# Patient Record
Sex: FEMALE | Race: WHITE | NOT HISPANIC OR LATINO | URBAN - METROPOLITAN AREA
[De-identification: names, ages, dates, MRNs, and addresses within clinical notes are randomized per-mention and may not be internally consistent; named-entity substitution may affect disease eponyms.]

---

## 2017-04-08 ENCOUNTER — EMERGENCY (EMERGENCY)
Facility: HOSPITAL | Age: 67
LOS: 1 days | Discharge: HOME | End: 2017-04-08
Attending: EMERGENCY MEDICINE | Admitting: EMERGENCY MEDICINE
Payer: MEDICARE

## 2017-04-08 VITALS
TEMPERATURE: 99 F | RESPIRATION RATE: 16 BRPM | HEART RATE: 95 BPM | OXYGEN SATURATION: 96 % | DIASTOLIC BLOOD PRESSURE: 77 MMHG | SYSTOLIC BLOOD PRESSURE: 122 MMHG

## 2017-04-08 VITALS
SYSTOLIC BLOOD PRESSURE: 116 MMHG | HEIGHT: 67 IN | RESPIRATION RATE: 18 BRPM | TEMPERATURE: 99 F | OXYGEN SATURATION: 96 % | DIASTOLIC BLOOD PRESSURE: 77 MMHG | HEART RATE: 88 BPM | WEIGHT: 156.09 LBS

## 2017-04-08 DIAGNOSIS — S32.810A MULTIPLE FRACTURES OF PELVIS WITH STABLE DISRUPTION OF PELVIC RING, INITIAL ENCOUNTER FOR CLOSED FRACTURE: ICD-10-CM

## 2017-04-08 DIAGNOSIS — Z79.899 OTHER LONG TERM (CURRENT) DRUG THERAPY: ICD-10-CM
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
## 2017-04-08 DIAGNOSIS — Z96.651 PRESENCE OF RIGHT ARTIFICIAL KNEE JOINT: Chronic | ICD-10-CM
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
## 2017-04-08 DIAGNOSIS — Z79.82 LONG TERM (CURRENT) USE OF ASPIRIN: ICD-10-CM

## 2017-04-08 DIAGNOSIS — R10.2 PELVIC AND PERINEAL PAIN: ICD-10-CM
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
## 2017-04-08 PROCEDURE — 99284 EMERGENCY DEPT VISIT MOD MDM: CPT | Mod: 25

## 2017-04-08 PROCEDURE — 72190 X-RAY EXAM OF PELVIS: CPT | Mod: 26

## 2017-04-08 PROCEDURE — 72190 X-RAY EXAM OF PELVIS: CPT

## 2017-04-08 PROCEDURE — 99284 EMERGENCY DEPT VISIT MOD MDM: CPT

## 2017-04-08 RX ORDER — ACETAMINOPHEN WITH CODEINE 300MG-30MG
1 TABLET ORAL
Qty: 20 | Refills: 0
Start: 2017-04-08 | End: 2017-04-13

## 2017-04-08 RX ORDER — ACETAMINOPHEN WITH CODEINE 300MG-30MG
1 TABLET ORAL ONCE
Qty: 0 | Refills: 0 | Status: DISCONTINUED | OUTPATIENT
Start: 2017-04-08 | End: 2017-04-08

## 2017-04-08 RX ADMIN — Medication 1 TABLET(S): at 22:12

## 2017-04-08 NOTE — ED PROVIDER NOTE - OBJECTIVE STATEMENT
pt to ed co trip and fall onto right side no fever no dizzy no headache no chills no NVD no chest pain no SOB no shakes no aches no other  injury no other complaints no neck pain no back pain no open wounds

## 2017-04-08 NOTE — ED ADULT TRIAGE NOTE - CHIEF COMPLAINT QUOTE
Pt directed to ED by Radiology with confirmed Fx to Coccyx s/p mechanical Fall yesterday.  MRI performed last night.  Pt denies Head injury, LOC, N/V/D, SOB, Fevers

## 2017-04-08 NOTE — ED PROVIDER NOTE - CARE PLAN
Principal Discharge DX:	Multiple closed fractures of pelvis with stable disruption of pelvic [place], initial encounter Principal Discharge DX:	Multiple closed fractures of pelvis with stable disruption of pelvic [place], initial encounter Principal Discharge DX:	Multiple closed fractures of pelvis with stable disruption of pelvic [place], initial encounter

## 2017-04-08 NOTE — ED PROVIDER NOTE - ATTENDING CONTRIBUTION TO CARE
65yo F here w/ trip and fall onto right side w/ no other  injury , feels well, walking around wihtout issue. xray pelvis neg and pt felt better with tylenol. dc home in NAD

## 2017-04-08 NOTE — ED ADULT NURSE NOTE - OBJECTIVE STATEMENT
presented with S/P fall yesterday; MRI done; Dx Fx Pelvis/Coccyx; right buttock muscle tear; refer to ED.

## 2017-04-08 NOTE — ED PROVIDER NOTE - PRINCIPAL DIAGNOSIS
Multiple closed fractures of pelvis with stable disruption of pelvic [place], initial encounter

## 2017-04-09 NOTE — CONSULT NOTE ADULT - ASSESSMENT
69F with R inferior pubic rami fracture  -Pain Control  -PT/WBAT with Walker or Crutches  -DVT PPX  BID based on previous history of PE during knee replacement surgery  -Pt was given option of being admitted to hospital vs. going home. Risks and benefits of both options were explained to the patient. Pt able to ambulate with walker. She will go home and f/u with Dr. Miller as an outpatient

## 2017-04-09 NOTE — CONSULT NOTE ADULT - SUBJECTIVE AND OBJECTIVE BOX
69F s/p mechanical fall yesterday AM p/w with R groin pain and difficulty ambulating. Pt fell in her home yesterday and went for a XRay on her PMD recommendations. After the XR was found to be equivocal she got a MRI, which showed nondisplaced R inferior pubic rami fractures and R sacral fractures. She then presented to [facility] ED for management. She denies any injury to her head, neck, spine. She denies any LOC/headache/blurry vision. She has no numbness/tingling in her involved extremity.    AFVSS    PE: RLE, negative log roll, unable to fully SLR, Sensation to light touch intact S/S/DP/SP/Tib, Strength EHL/FHL/TA/GS 5/5, DP 2+, Ext WWP    No TTP over head, neck, spine, other extremities    XR: R inferior pubic rami fracture

## 2018-01-08 NOTE — ED ADULT NURSE NOTE - NS ED NOTE  TALK SOMEONE YN
Quality 111:Pneumonia Vaccination Status For Older Adults: Pneumococcal Vaccination not Administered or Previously Received, Reason not Otherwise Specified Quality 47: Advance Care Plan: Advance care planning not documented, reason not otherwise specified. Quality 431: Preventive Care And Screening: Unhealthy Alcohol Use - Screening: Patient screened for unhealthy alcohol use using a single question and scores less than 2 times per year Detail Level: Detailed Quality 110: Preventive Care And Screening: Influenza Immunization: Influenza Immunization Ordered or Recommended, but not Administered due to system reason Quality 226: Preventive Care And Screening: Tobacco Use: Screening And Cessation Intervention: Patient screened for tobacco and never smoked Quality 130: Documentation Of Current Medications In The Medical Record: Current Medications Documented no

## 2019-07-25 NOTE — ED ADULT TRIAGE NOTE - PRO INTERPRETER NEED 2
Pt working with PT and OT throughout day. Pt tolerating ambulating in hallway with walker and supervision of therapy. Pt voiding without difficulty and states he has had a bm this am. Pt working on getting dressed, cares with OT. Pt blood sugars 180's-200's. Pt on glucophage at home po. Diet education reinforced with pt. Pt states his blood sugars run 140's-150's at home. Neck incision cdi with steri strips. Pt states pain adequatley controlled at most times with tylenol and ice packs.   English

## 2020-04-07 ENCOUNTER — INPATIENT (INPATIENT)
Facility: HOSPITAL | Age: 70
LOS: 7 days | Discharge: ANOTHER HOSPITAL | DRG: 871 | End: 2020-04-15
Attending: STUDENT IN AN ORGANIZED HEALTH CARE EDUCATION/TRAINING PROGRAM | Admitting: STUDENT IN AN ORGANIZED HEALTH CARE EDUCATION/TRAINING PROGRAM
Payer: MEDICARE

## 2020-04-07 VITALS
HEART RATE: 145 BPM | SYSTOLIC BLOOD PRESSURE: 135 MMHG | HEIGHT: 66 IN | WEIGHT: 156.09 LBS | TEMPERATURE: 99 F | DIASTOLIC BLOOD PRESSURE: 77 MMHG | RESPIRATION RATE: 42 BRPM | OXYGEN SATURATION: 63 %

## 2020-04-07 DIAGNOSIS — U07.1 COVID-19: ICD-10-CM

## 2020-04-07 DIAGNOSIS — E44.0 MODERATE PROTEIN-CALORIE MALNUTRITION: ICD-10-CM

## 2020-04-07 DIAGNOSIS — J96.01 ACUTE RESPIRATORY FAILURE WITH HYPOXIA: ICD-10-CM

## 2020-04-07 DIAGNOSIS — J12.89 OTHER VIRAL PNEUMONIA: ICD-10-CM

## 2020-04-07 DIAGNOSIS — Z96.651 PRESENCE OF RIGHT ARTIFICIAL KNEE JOINT: Chronic | ICD-10-CM

## 2020-04-07 DIAGNOSIS — A41.9 SEPSIS, UNSPECIFIED ORGANISM: ICD-10-CM

## 2020-04-07 DIAGNOSIS — B96.20 UNSPECIFIED ESCHERICHIA COLI [E. COLI] AS THE CAUSE OF DISEASES CLASSIFIED ELSEWHERE: ICD-10-CM

## 2020-04-07 DIAGNOSIS — Z29.9 ENCOUNTER FOR PROPHYLACTIC MEASURES, UNSPECIFIED: ICD-10-CM

## 2020-04-07 DIAGNOSIS — Z86.711 PERSONAL HISTORY OF PULMONARY EMBOLISM: ICD-10-CM

## 2020-04-07 DIAGNOSIS — R32 UNSPECIFIED URINARY INCONTINENCE: ICD-10-CM

## 2020-04-07 DIAGNOSIS — J15.9 UNSPECIFIED BACTERIAL PNEUMONIA: ICD-10-CM

## 2020-04-07 LAB
ALBUMIN SERPL ELPH-MCNC: 2.6 G/DL — LOW (ref 3.3–5)
ALP SERPL-CCNC: 75 U/L — SIGNIFICANT CHANGE UP (ref 40–120)
ALT FLD-CCNC: 16 U/L — SIGNIFICANT CHANGE UP (ref 10–45)
ANION GAP SERPL CALC-SCNC: 15 MMOL/L — SIGNIFICANT CHANGE UP (ref 5–17)
APPEARANCE UR: CLEAR — SIGNIFICANT CHANGE UP
APTT BLD: 32.6 SEC — SIGNIFICANT CHANGE UP (ref 27.5–36.3)
AST SERPL-CCNC: 19 U/L — SIGNIFICANT CHANGE UP (ref 10–40)
BASE EXCESS BLDV CALC-SCNC: -0.4 MMOL/L — SIGNIFICANT CHANGE UP
BASOPHILS # BLD AUTO: 0.04 K/UL — SIGNIFICANT CHANGE UP (ref 0–0.2)
BASOPHILS NFR BLD AUTO: 0.3 % — SIGNIFICANT CHANGE UP (ref 0–2)
BILIRUB SERPL-MCNC: 0.4 MG/DL — SIGNIFICANT CHANGE UP (ref 0.2–1.2)
BILIRUB UR-MCNC: NEGATIVE — SIGNIFICANT CHANGE UP
BUN SERPL-MCNC: 9 MG/DL — SIGNIFICANT CHANGE UP (ref 7–23)
CALCIUM SERPL-MCNC: 8.9 MG/DL — SIGNIFICANT CHANGE UP (ref 8.4–10.5)
CHLORIDE SERPL-SCNC: 105 MMOL/L — SIGNIFICANT CHANGE UP (ref 96–108)
CK MB CFR SERPL CALC: 1.2 NG/ML — SIGNIFICANT CHANGE UP (ref 0–6.7)
CK SERPL-CCNC: 23 U/L — LOW (ref 25–170)
CO2 SERPL-SCNC: 21 MMOL/L — LOW (ref 22–31)
COLOR SPEC: YELLOW — SIGNIFICANT CHANGE UP
CREAT SERPL-MCNC: 0.58 MG/DL — SIGNIFICANT CHANGE UP (ref 0.5–1.3)
CRP SERPL-MCNC: 19.76 MG/DL — HIGH (ref 0–0.4)
D DIMER BLD IA.RAPID-MCNC: 749 NG/ML DDU — HIGH
DIFF PNL FLD: ABNORMAL
EOSINOPHIL # BLD AUTO: 0 K/UL — SIGNIFICANT CHANGE UP (ref 0–0.5)
EOSINOPHIL NFR BLD AUTO: 0 % — SIGNIFICANT CHANGE UP (ref 0–6)
FERRITIN SERPL-MCNC: 307 NG/ML — HIGH (ref 15–150)
FIBRINOGEN PPP-MCNC: 605 MG/DL — HIGH (ref 258–438)
GLUCOSE SERPL-MCNC: 131 MG/DL — HIGH (ref 70–99)
GLUCOSE UR QL: NEGATIVE — SIGNIFICANT CHANGE UP
HCO3 BLDV-SCNC: 24 MMOL/L — SIGNIFICANT CHANGE UP (ref 20–27)
HCT VFR BLD CALC: 43.3 % — SIGNIFICANT CHANGE UP (ref 34.5–45)
HGB BLD-MCNC: 13.2 G/DL — SIGNIFICANT CHANGE UP (ref 11.5–15.5)
IMM GRANULOCYTES NFR BLD AUTO: 1.6 % — HIGH (ref 0–1.5)
INR BLD: 1.65 — HIGH (ref 0.88–1.16)
KETONES UR-MCNC: NEGATIVE — SIGNIFICANT CHANGE UP
LACTATE SERPL-SCNC: 2.5 MMOL/L — HIGH (ref 0.5–2)
LDH SERPL L TO P-CCNC: 268 U/L — HIGH (ref 50–242)
LEUKOCYTE ESTERASE UR-ACNC: ABNORMAL
LYMPHOCYTES # BLD AUTO: 1.34 K/UL — SIGNIFICANT CHANGE UP (ref 1–3.3)
LYMPHOCYTES # BLD AUTO: 8.4 % — LOW (ref 13–44)
MCHC RBC-ENTMCNC: 26.7 PG — LOW (ref 27–34)
MCHC RBC-ENTMCNC: 30.5 GM/DL — LOW (ref 32–36)
MCV RBC AUTO: 87.7 FL — SIGNIFICANT CHANGE UP (ref 80–100)
MONOCYTES # BLD AUTO: 1.55 K/UL — HIGH (ref 0–0.9)
MONOCYTES NFR BLD AUTO: 9.8 % — SIGNIFICANT CHANGE UP (ref 2–14)
NEUTROPHILS # BLD AUTO: 12.7 K/UL — HIGH (ref 1.8–7.4)
NEUTROPHILS NFR BLD AUTO: 79.9 % — HIGH (ref 43–77)
NITRITE UR-MCNC: POSITIVE
NRBC # BLD: 0 /100 WBCS — SIGNIFICANT CHANGE UP (ref 0–0)
PCO2 BLDV: 38 MMHG — LOW (ref 41–51)
PH BLDV: 7.42 — SIGNIFICANT CHANGE UP (ref 7.32–7.43)
PH UR: 6 — SIGNIFICANT CHANGE UP (ref 5–8)
PLATELET # BLD AUTO: 728 K/UL — HIGH (ref 150–400)
PO2 BLDV: 20 MMHG — SIGNIFICANT CHANGE UP
POTASSIUM SERPL-MCNC: 4 MMOL/L — SIGNIFICANT CHANGE UP (ref 3.5–5.3)
POTASSIUM SERPL-SCNC: 4 MMOL/L — SIGNIFICANT CHANGE UP (ref 3.5–5.3)
PROCALCITONIN SERPL-MCNC: 0.05 NG/ML — SIGNIFICANT CHANGE UP (ref 0.02–0.1)
PROT SERPL-MCNC: 6.9 G/DL — SIGNIFICANT CHANGE UP (ref 6–8.3)
PROT UR-MCNC: NEGATIVE MG/DL — SIGNIFICANT CHANGE UP
PROTHROM AB SERPL-ACNC: 19.1 SEC — HIGH (ref 10–12.9)
RBC # BLD: 4.94 M/UL — SIGNIFICANT CHANGE UP (ref 3.8–5.2)
RBC # FLD: 14.5 % — SIGNIFICANT CHANGE UP (ref 10.3–14.5)
SAO2 % BLDV: 29 % — SIGNIFICANT CHANGE UP
SARS-COV-2 RNA SPEC QL NAA+PROBE: SIGNIFICANT CHANGE UP
SODIUM SERPL-SCNC: 141 MMOL/L — SIGNIFICANT CHANGE UP (ref 135–145)
SP GR SPEC: 1.02 — SIGNIFICANT CHANGE UP (ref 1–1.03)
TROPONIN T SERPL-MCNC: <0.01 NG/ML — SIGNIFICANT CHANGE UP (ref 0–0.01)
UROBILINOGEN FLD QL: 1 E.U./DL — SIGNIFICANT CHANGE UP
WBC # BLD: 15.89 K/UL — HIGH (ref 3.8–10.5)
WBC # FLD AUTO: 15.89 K/UL — HIGH (ref 3.8–10.5)

## 2020-04-07 PROCEDURE — 99223 1ST HOSP IP/OBS HIGH 75: CPT | Mod: GC

## 2020-04-07 PROCEDURE — 71045 X-RAY EXAM CHEST 1 VIEW: CPT | Mod: 26

## 2020-04-07 PROCEDURE — 99291 CRITICAL CARE FIRST HOUR: CPT

## 2020-04-07 PROCEDURE — 93010 ELECTROCARDIOGRAM REPORT: CPT

## 2020-04-07 RX ORDER — HYDROXYCHLOROQUINE SULFATE 200 MG
TABLET ORAL
Refills: 0 | Status: DISCONTINUED | OUTPATIENT
Start: 2020-04-07 | End: 2020-04-07

## 2020-04-07 RX ORDER — AZITHROMYCIN 500 MG/1
500 TABLET, FILM COATED ORAL ONCE
Refills: 0 | Status: COMPLETED | OUTPATIENT
Start: 2020-04-07 | End: 2020-04-07

## 2020-04-07 RX ORDER — CEFTRIAXONE 500 MG/1
2000 INJECTION, POWDER, FOR SOLUTION INTRAMUSCULAR; INTRAVENOUS EVERY 24 HOURS
Refills: 0 | Status: DISCONTINUED | OUTPATIENT
Start: 2020-04-07 | End: 2020-04-09

## 2020-04-07 RX ORDER — ACETAMINOPHEN 500 MG
650 TABLET ORAL ONCE
Refills: 0 | Status: COMPLETED | OUTPATIENT
Start: 2020-04-07 | End: 2020-04-07

## 2020-04-07 RX ORDER — ACETAMINOPHEN 500 MG
650 TABLET ORAL EVERY 6 HOURS
Refills: 0 | Status: DISCONTINUED | OUTPATIENT
Start: 2020-04-07 | End: 2020-04-15

## 2020-04-07 RX ORDER — ASPIRIN/CALCIUM CARB/MAGNESIUM 324 MG
162 TABLET ORAL DAILY
Refills: 0 | Status: DISCONTINUED | OUTPATIENT
Start: 2020-04-07 | End: 2020-04-08

## 2020-04-07 RX ORDER — PIPERACILLIN AND TAZOBACTAM 4; .5 G/20ML; G/20ML
3.38 INJECTION, POWDER, LYOPHILIZED, FOR SOLUTION INTRAVENOUS ONCE
Refills: 0 | Status: COMPLETED | OUTPATIENT
Start: 2020-04-07 | End: 2020-04-07

## 2020-04-07 RX ORDER — AZITHROMYCIN 500 MG/1
250 TABLET, FILM COATED ORAL EVERY 24 HOURS
Refills: 0 | Status: DISCONTINUED | OUTPATIENT
Start: 2020-04-08 | End: 2020-04-12

## 2020-04-07 RX ADMIN — Medication 650 MILLIGRAM(S): at 19:55

## 2020-04-07 RX ADMIN — PIPERACILLIN AND TAZOBACTAM 3.38 GRAM(S): 4; .5 INJECTION, POWDER, LYOPHILIZED, FOR SOLUTION INTRAVENOUS at 23:11

## 2020-04-07 RX ADMIN — CEFTRIAXONE 100 MILLIGRAM(S): 500 INJECTION, POWDER, FOR SOLUTION INTRAMUSCULAR; INTRAVENOUS at 23:27

## 2020-04-07 RX ADMIN — AZITHROMYCIN 500 MILLIGRAM(S): 500 TABLET, FILM COATED ORAL at 20:29

## 2020-04-07 RX ADMIN — AZITHROMYCIN 255 MILLIGRAM(S): 500 TABLET, FILM COATED ORAL at 20:00

## 2020-04-07 RX ADMIN — PIPERACILLIN AND TAZOBACTAM 200 GRAM(S): 4; .5 INJECTION, POWDER, LYOPHILIZED, FOR SOLUTION INTRAVENOUS at 21:31

## 2020-04-07 NOTE — H&P ADULT - PROBLEM SELECTOR PLAN 3
patient presenting with 3 weeks of cough, fatigue, decreased appetite and progrssive SOB. CXR showing extensive bilateral patchy and reticular nodular infiltrates. COVID neg. Possible infiltrate on CXR can be potentially superimposed bacterial pneumonia given septic presentation and initial COVID neg, however procalcitonin only mildly elevated to 0.5  - pt febrile in ED to 101.4; increasing oxygen requirements while in the ED from 60s% on RA to 94% 15L NRB  - CRP 19.76, D-dimer 749, Ferritin 307, Fibrinogen 605 lactate 2.5,   - started hydroxychloroquine and azithromycin 500mg   - s/p azithromycin/zosyn in the ED   - continue ceftraizone 2g q24hrs for bacterial pneumonia coverage  - Continue to trend daily LFTs, ferritin, LDH, CRP, procalcitonin  - Cardiac enzymes q3days including BNP  - Tylenol PRN for fever, avoid NSAIDs  - Supplemental O2 via NRB 15L saturating 97%.   - Reswab in the AM, given high suspicion

## 2020-04-07 NOTE — H&P ADULT - ATTENDING COMMENTS
This is a 70 y/o female with:  -acute hypoxemic respiratory failure requiring NRM  -PNA  -SARs related virus r/o

## 2020-04-07 NOTE — H&P ADULT - PROBLEM SELECTOR PLAN 1
Patient presenting w/fever to 101.4, tachycardia to 145, RR 42, leukocytosis, Lactate 2.5. Likely source pulmonary in the setting of respiratory distress. Differentials include viral pneumonia (?COVID in the setting of bilateral patchy opacities on xray and elevated inflammatory markers)  vs. bacterial pneumonia given findings of nodular infiltrates on CXR and COVID neg on initial swab   - s/p azithromycin/Zosyn in the ED   - continue with ceftriaxone 2g [id number]

## 2020-04-07 NOTE — H&P ADULT - NSHPPHYSICALEXAM_GEN_ALL_CORE
Vital Signs Last 12 Hrs  T(F): 98.5 (04-07-20 @ 21:48), Max: 101.4 (04-07-20 @ 20:23)  HR: 106 (04-07-20 @ 22:17) (106 - 145)  BP: 115/75 (04-07-20 @ 22:17) (115/75 - 135/77)  BP(mean): --  RR: 26 (04-07-20 @ 22:17) (26 - 42)  SpO2: 98% (04-07-20 @ 22:17) (63% - 99%)    PHYSICAL EXAM:  Constitutional: NAD, comfortable in bed, saturating 93% on NRB, speaking in full sentences.  HEENT: NC/AT, PERRLA, EOMI, no conjunctival pallor or scleral icterus, MMM  Neck: Supple, no JVD  Respiratory: bilateral basilar crackles in lung bases.  Cardiovascular: RRR, normal S1 and S2, no m/r/g.   Gastrointestinal: +BS, soft NTND, no guarding or rebound tenderness, no palpable masses   Extremities: wwp; no cyanosis, clubbing or edema.   Vascular: Pulses equal and strong throughout.   Neurological: AAOx3, no CN deficits, strength and sensation intact throughout.   Skin: No gross skin abnormalities or rashes

## 2020-04-07 NOTE — ED PROVIDER NOTE - OBJECTIVE STATEMENT
69F PMH PE (presumed provoked after ortho surgery, no longer on AC other than asa), p/w SOB. Pt w/ 3w of cough, f/c, fatigue, decreased appetite. Now w/ several days of worsening SOB. Denies HA, CP, abd pain, urinary complaints, NVD, focal weakness/numbness, rashes, black/bloody stool.

## 2020-04-07 NOTE — ED PROVIDER NOTE - CLINICAL SUMMARY MEDICAL DECISION MAKING FREE TEXT BOX
69F PMH PE (presumed provoked after ortho surgery, no longer on AC other than asa), p/w SOB. Pt w/ 3w of cough, f/c, fatigue, decreased appetite. Now w/ several days of worsening SOB. Tachypneic, hypoxic, febrile, tachycardic, maintaining BP. Exam as above.  ddx: Sepsis. Likely progressing COVID. Clinically less likely PE.   PT increased sats to low 90s on 15L NRB, will attempt proning.  PT in moderate to severe resp distress prior to NRB, appears more comfortable on NRB. will reassess.  covid labs keyla ames. Reassess. Updated .

## 2020-04-07 NOTE — H&P ADULT - NSICDXPASTMEDICALHX_GEN_ALL_CORE_FT
PAST MEDICAL HISTORY:  Eye problem right eye    Pulmonary embolism post knee replacement 5 years ago

## 2020-04-07 NOTE — H&P ADULT - PROBLEM SELECTOR PLAN 4
history of provoked DVT/PE 7 years ago after right knee replacemnt surgery. Previously was on Coumadin, now on aspirin.   - Presenting with tachycardia to 140s, however low suspicion of PE, given no lower extremity edema and pain on inspiration   - continue with home aspirin 162mg daily

## 2020-04-07 NOTE — H&P ADULT - ASSESSMENT
69F PMH PE (provoked after knee replacement surgery 7 years ago, no longer on AC other than ASA), presents with 3 weeks of myalgias, cough, and progressive SOB admitted for acute hypoxic respiratory failure in the setting of sepsis secondary to bacterial pneumona v. COVID.

## 2020-04-07 NOTE — ED PROVIDER NOTE - PROGRESS NOTE DETAILS
Klepfish: wbc 16, elevated inflammatory markers, other labs grossly wnl. lactate 2.2. CXR w/ extensive b/l inflitrates. given leukocytosis, and relatively late onset of SOB in relation to start of her symptoms (almost 3 weeks out), covered w/ zosyn for possible bacterial infection. PT HR now ~120, satting mid 90s on 15L NRB. She feels unchanged but does appear slightly better, still tachypneic. Angie: HR now 115, satting 93% on NRB, still tachypneic but looking much better than arrival, feels slightly better. will admit for further care. [place]: covid negative. High concern for false negative. already covered w/ zosyn for possible bacterial infeciton. signed out. PT observed in ED for >2hrs w/ improvement.

## 2020-04-07 NOTE — H&P ADULT - HISTORY OF PRESENT ILLNESS
69F PMH PE (provoked after knee replacement surgery 7 years ago, no longer on AC other than ASA), presents with 3 weeks of myalgias, cough, and progressive shortness of breath. She says that initially her symptoms started with sore throat and an earache which has resolved in a week. Then, she developed myalgias and a cough which are still ongoing. She says that during this time, she spoke with her PCP over the phone who recommended supportive care and did not prescribe any antibiotics She noticed that her breathing progressively worsened and she decided to seek medical attention. She denies any recent travel or sick contacts. She currently denies any chills, chest pain, abdominal pain, n/v/d/c, edema.     ED COURSE:  VS: T 99.3F, , /77, RR 42, 63% RA -> 98% on NRB   Labs: WBC 15.89 not lymphopenic, Hgb 13.2, Plt 728, Na 141, K 4.0, BUN/Cr 9/0.58, AST/ALT 19/16,   CRP 19.76, D-dimer 749, Ferritin 307, Fibrinogen 605 lactate 2.5, troponin neg, Procal 0.59 pro-. COVID negative  CXR: extensive bilateral patchy and reticular nodular infiltrates.  Interventions: Tylenol 650mg PO, Azithromycin 500mg IV x1, Zosyn 3.375g IV x1.

## 2020-04-07 NOTE — ED ADULT NURSE NOTE - OBJECTIVE STATEMENT
pt received into RESUS room a&Ox3 ambulatory from wheelchair to stretcher appears uncomfortable and tachypneic O2 sat 63% on room air RR in 40's appears diaphoretic sinus tach to 140's on continuous cardiac monitor. pt placed on NRB 15L sats improve to 99% is now able to speak slow short sentences reports weakness fatigue cough x 3 days and hx PE not of AC. 12 lead ekg done 20G placed to L forearm 18G placed to RAC labs drawn and sent pt rectally febrile medicated per orders will monitor and reassess closely

## 2020-04-07 NOTE — H&P ADULT - PROBLEM SELECTOR PLAN 2
Patient presenting hypoxic to the 60s on RA, now sating 97% on NRB, nontachypneic with associated weakness/lethargy and findings extensive bilateral patchy and reticular nodular infiltrates CXR concerning for possible COVID  - currently saturating 97% on NRB, no respiratory distress, not using accessory muscles, no increased work of breathing   - continue to monitor respiratory status  - COVID PCR negative x1; f/u repeat swab given high clinical suspicion  - will empirically start on azithromycin/plaquinil given clinical status  - s/p azithromycin/zosyn in the ED   - continue ceftriaxone 2g q24hrs for bacterial pneumonia coverage  - trend inflammatory markers.

## 2020-04-07 NOTE — ED ADULT TRIAGE NOTE - OTHER COMPLAINTS
Pt is not able to speak at this time. +cough, no fever at home. Pt is not able to speak at this time. +cough, no fever at home. Hx of PE.

## 2020-04-07 NOTE — ED PROVIDER NOTE - PHYSICAL EXAMINATION
no LE edema, normal equal distal pulses.   minimal diffuse coarse breath sounds. good air entry b/l.   very tachypneic. moderate to severe resp distress.  3-4 word sentences.

## 2020-04-08 PROBLEM — I26.99 OTHER PULMONARY EMBOLISM WITHOUT ACUTE COR PULMONALE: Chronic | Status: ACTIVE | Noted: 2017-04-08

## 2020-04-08 PROBLEM — H57.9 UNSPECIFIED DISORDER OF EYE AND ADNEXA: Chronic | Status: ACTIVE | Noted: 2017-04-08

## 2020-04-08 LAB
ALBUMIN SERPL ELPH-MCNC: 2.2 G/DL — LOW (ref 3.3–5)
ALP SERPL-CCNC: 62 U/L — SIGNIFICANT CHANGE UP (ref 40–120)
ALT FLD-CCNC: 11 U/L — SIGNIFICANT CHANGE UP (ref 10–45)
ANION GAP SERPL CALC-SCNC: 11 MMOL/L — SIGNIFICANT CHANGE UP (ref 5–17)
AST SERPL-CCNC: 15 U/L — SIGNIFICANT CHANGE UP (ref 10–40)
BASOPHILS # BLD AUTO: 0.05 K/UL — SIGNIFICANT CHANGE UP (ref 0–0.2)
BASOPHILS NFR BLD AUTO: 0.4 % — SIGNIFICANT CHANGE UP (ref 0–2)
BILIRUB SERPL-MCNC: 0.2 MG/DL — SIGNIFICANT CHANGE UP (ref 0.2–1.2)
BUN SERPL-MCNC: 7 MG/DL — SIGNIFICANT CHANGE UP (ref 7–23)
CALCIUM SERPL-MCNC: 8.4 MG/DL — SIGNIFICANT CHANGE UP (ref 8.4–10.5)
CHLORIDE SERPL-SCNC: 104 MMOL/L — SIGNIFICANT CHANGE UP (ref 96–108)
CO2 SERPL-SCNC: 25 MMOL/L — SIGNIFICANT CHANGE UP (ref 22–31)
CREAT SERPL-MCNC: 0.64 MG/DL — SIGNIFICANT CHANGE UP (ref 0.5–1.3)
CRP SERPL-MCNC: 18.49 MG/DL — HIGH (ref 0–0.4)
D DIMER BLD IA.RAPID-MCNC: 745 NG/ML DDU — HIGH
EOSINOPHIL # BLD AUTO: 0.01 K/UL — SIGNIFICANT CHANGE UP (ref 0–0.5)
EOSINOPHIL NFR BLD AUTO: 0.1 % — SIGNIFICANT CHANGE UP (ref 0–6)
FERRITIN SERPL-MCNC: 279 NG/ML — HIGH (ref 15–150)
GLUCOSE SERPL-MCNC: 105 MG/DL — HIGH (ref 70–99)
HCT VFR BLD CALC: 37.2 % — SIGNIFICANT CHANGE UP (ref 34.5–45)
HCV AB S/CO SERPL IA: 0.16 S/CO — SIGNIFICANT CHANGE UP
HCV AB SERPL-IMP: SIGNIFICANT CHANGE UP
HGB BLD-MCNC: 11.4 G/DL — LOW (ref 11.5–15.5)
IMM GRANULOCYTES NFR BLD AUTO: 1.4 % — SIGNIFICANT CHANGE UP (ref 0–1.5)
LDH SERPL L TO P-CCNC: 283 U/L — HIGH (ref 50–242)
LYMPHOCYTES # BLD AUTO: 1 K/UL — SIGNIFICANT CHANGE UP (ref 1–3.3)
LYMPHOCYTES # BLD AUTO: 7.1 % — LOW (ref 13–44)
MAGNESIUM SERPL-MCNC: 2.1 MG/DL — SIGNIFICANT CHANGE UP (ref 1.6–2.6)
MCHC RBC-ENTMCNC: 27.2 PG — SIGNIFICANT CHANGE UP (ref 27–34)
MCHC RBC-ENTMCNC: 30.6 GM/DL — LOW (ref 32–36)
MCV RBC AUTO: 88.8 FL — SIGNIFICANT CHANGE UP (ref 80–100)
MONOCYTES # BLD AUTO: 1.5 K/UL — HIGH (ref 0–0.9)
MONOCYTES NFR BLD AUTO: 10.7 % — SIGNIFICANT CHANGE UP (ref 2–14)
NEUTROPHILS # BLD AUTO: 11.3 K/UL — HIGH (ref 1.8–7.4)
NEUTROPHILS NFR BLD AUTO: 80.3 % — HIGH (ref 43–77)
NRBC # BLD: 0 /100 WBCS — SIGNIFICANT CHANGE UP (ref 0–0)
PHOSPHATE SERPL-MCNC: 3.2 MG/DL — SIGNIFICANT CHANGE UP (ref 2.5–4.5)
PLATELET # BLD AUTO: 508 K/UL — HIGH (ref 150–400)
POTASSIUM SERPL-MCNC: 4.1 MMOL/L — SIGNIFICANT CHANGE UP (ref 3.5–5.3)
POTASSIUM SERPL-SCNC: 4.1 MMOL/L — SIGNIFICANT CHANGE UP (ref 3.5–5.3)
PROT SERPL-MCNC: 5.9 G/DL — LOW (ref 6–8.3)
RAPID RVP RESULT: SIGNIFICANT CHANGE UP
RBC # BLD: 4.19 M/UL — SIGNIFICANT CHANGE UP (ref 3.8–5.2)
RBC # FLD: 14.7 % — HIGH (ref 10.3–14.5)
SARS-COV-2 RNA SPEC QL NAA+PROBE: SIGNIFICANT CHANGE UP
SODIUM SERPL-SCNC: 140 MMOL/L — SIGNIFICANT CHANGE UP (ref 135–145)
WBC # BLD: 14.05 K/UL — HIGH (ref 3.8–10.5)
WBC # FLD AUTO: 14.05 K/UL — HIGH (ref 3.8–10.5)

## 2020-04-08 PROCEDURE — 71045 X-RAY EXAM CHEST 1 VIEW: CPT | Mod: 26

## 2020-04-08 RX ORDER — OXYBUTYNIN CHLORIDE 5 MG
5 TABLET ORAL DAILY
Refills: 0 | Status: DISCONTINUED | OUTPATIENT
Start: 2020-04-08 | End: 2020-04-15

## 2020-04-08 RX ORDER — ENOXAPARIN SODIUM 100 MG/ML
40 INJECTION SUBCUTANEOUS EVERY 24 HOURS
Refills: 0 | Status: DISCONTINUED | OUTPATIENT
Start: 2020-04-08 | End: 2020-04-15

## 2020-04-08 RX ORDER — TOCILIZUMAB 20 MG/ML
400 INJECTION, SOLUTION, CONCENTRATE INTRAVENOUS ONCE
Refills: 0 | Status: COMPLETED | OUTPATIENT
Start: 2020-04-08 | End: 2020-04-08

## 2020-04-08 RX ORDER — HYDROXYCHLOROQUINE SULFATE 200 MG
200 TABLET ORAL EVERY 12 HOURS
Refills: 0 | Status: DISCONTINUED | OUTPATIENT
Start: 2020-04-09 | End: 2020-04-12

## 2020-04-08 RX ORDER — HYDROXYCHLOROQUINE SULFATE 200 MG
400 TABLET ORAL EVERY 12 HOURS
Refills: 0 | Status: COMPLETED | OUTPATIENT
Start: 2020-04-08 | End: 2020-04-09

## 2020-04-08 RX ORDER — FAMOTIDINE 10 MG/ML
20 INJECTION INTRAVENOUS EVERY 12 HOURS
Refills: 0 | Status: DISCONTINUED | OUTPATIENT
Start: 2020-04-08 | End: 2020-04-10

## 2020-04-08 RX ORDER — FAMOTIDINE 10 MG/ML
20 INJECTION INTRAVENOUS
Refills: 0 | Status: DISCONTINUED | OUTPATIENT
Start: 2020-04-08 | End: 2020-04-08

## 2020-04-08 RX ORDER — HYDROXYCHLOROQUINE SULFATE 200 MG
TABLET ORAL
Refills: 0 | Status: DISCONTINUED | OUTPATIENT
Start: 2020-04-08 | End: 2020-04-12

## 2020-04-08 RX ADMIN — AZITHROMYCIN 250 MILLIGRAM(S): 500 TABLET, FILM COATED ORAL at 19:41

## 2020-04-08 RX ADMIN — FAMOTIDINE 20 MILLIGRAM(S): 10 INJECTION INTRAVENOUS at 15:54

## 2020-04-08 RX ADMIN — ENOXAPARIN SODIUM 40 MILLIGRAM(S): 100 INJECTION SUBCUTANEOUS at 06:53

## 2020-04-08 RX ADMIN — Medication 40 MILLIGRAM(S): at 15:53

## 2020-04-08 RX ADMIN — Medication 5 MILLIGRAM(S): at 21:14

## 2020-04-08 RX ADMIN — TOCILIZUMAB 100 MILLIGRAM(S): 20 INJECTION, SOLUTION, CONCENTRATE INTRAVENOUS at 15:54

## 2020-04-08 RX ADMIN — Medication 400 MILLIGRAM(S): at 15:54

## 2020-04-08 RX ADMIN — CEFTRIAXONE 100 MILLIGRAM(S): 500 INJECTION, POWDER, FOR SOLUTION INTRAMUSCULAR; INTRAVENOUS at 21:15

## 2020-04-08 NOTE — ED ADULT NURSE REASSESSMENT NOTE - NS ED NURSE REASSESS COMMENT FT1
Rpt given to Arabella RN's for night shift continence of care. Pt A&Ox3 resting on stretcher NSR to continuous cardiac monitor remains on NRB sating 98% respirations even and still slightly labored, pt in NAD

## 2020-04-08 NOTE — PROGRESS NOTE ADULT - ASSESSMENT
69F PMH PE (provoked after knee replacement surgery 7 years ago, no longer on AC other than ASA), presents with 3 weeks of myalgias, cough, and progressive SOB admitted for acute hypoxic respiratory failure in the setting of sepsis secondary to bacterial pneumona v. COVID.     Problem/Plan - 1:  ·  Severe sepsis.  Patient presenting w/fever to 101.4, tachycardia to 145, RR 42, leukocytosis, Lactate 2.5. Likely source pulmonary in the setting of respiratory distress. Differentials include viral pneumonia (?COVID in the setting of bilateral patchy opacities on xray and elevated inflammatory markers)  vs. bacterial pneumonia given findings of nodular infiltrates on CXR and COVID neg on initial swab   - repeat COVID swab  - c/w zithromax/plauqenil  - continue with ceftriaxone 2g [id number].   - started solumedrol 40 IV q 12 and gave Tocizumab x1     Problem/Plan - 2:  ·  Problem: Acute hypoxemic respiratory failure.  Plan: Patient presenting hypoxic to the 60s on RA with findings extensive bilateral patchy and reticular nodular infiltrates CXR concerning for possible COVID  - currently saturating 95% on NRB, no respiratory distress, not using accessory muscles, no increased work of breathing   - continue to monitor respiratory status  - COVID PCR negative x1; f/u repeat swab given high clinical suspicion  - will empirically start on azithromycin/plaquinil given clinical status  - will start solumderol 40 IV q12 and gave Tocizumab x1  - continue ceftriaxone 2g q24hrs for bacterial pneumonia coverage  - trend inflammatory markers.   - prone as tolerated for goal of 2 hours     Problem/Plan - 3:    Hx of DVT s/p TKR   - PE hx is remote and provoked, on Aspirin at home   - D-dimer below 1500, no need for AC   - cont to trend D-dimer   - c/w lovenox 40 daily for DVT ppx     Problem/Plan - 5:  ·  Problem: Prophylactic measure.   GI ppx: pepcid 20 IV bid   E: Replete K<4, Mg<2  N: Kosher Diet.

## 2020-04-08 NOTE — PROGRESS NOTE ADULT - SUBJECTIVE AND OBJECTIVE BOX
Patient discussed on morning rounds with Dr. Fisher     SUBJECTIVE ASSESSMENT:    Pt reports dyspnea, feels it is unchanged since arrival to [address] [place] [address].  She admits to difficulty tolerating proning, she has only been able to prone for about 5minutes at a time.  She also c/o extreme lethargy and fatigue.    Vital Signs Last 24 Hrs  T(C): 37.1 (2020 09:42), Max: 38.6 (2020 20:23)  T(F): 98.8 (2020 09:42), Max: 101.4 (2020 20:23)  HR: 104 (2020 10:19) (92 - 145)  BP: 108/58 (2020 09:42) (102/53 - 135/77)  BP(mean): 76 (2020 04:30) (76 - 76)  RR: 27 (2020 10:19) (20 - 42)  SpO2: 96% (2020 10:19) (63% - 99%)  I&O's Detail    2020 07:01  -  2020 15:23  --------------------------------------------------------  IN:    Oral Fluid: 200 mL  Total IN: 200 mL    OUT:    Voided: 150 mL  Total OUT: 150 mL    Total NET: 50 mL    PHYSICAL EXAM:    General: pt appears very fatigued    Neurological: A&Ox3    Cardiovascular: s1S2 RRR    Respiratory: normal respiratory rate without accessory muscle use    Gastrointestinal: soft NT/ND    Extremities: no edema    Vascular: warm and well perfused bilaterally    LABS:                        11.4   14.05 )-----------( 508      ( 2020 07:01 )             37.2     PT/INR - ( 2020 20:06 )   PT: 19.1 sec;   INR: 1.65       PTT - ( 2020 20:06 )  PTT:32.6 sec    08    140  |  104  |  7   ----------------------------<  105<H>  4.1   |  25  |  0.64    Ca    8.4      2020 07:01  Phos  3.2     04-08  Mg     2.1     04-08    TPro  5.9<L>  /  Alb  2.2<L>  /  TBili  0.2  /  DBili  x   /  AST  15  /  ALT  11  /  AlkPhos  62  04-08    Urinalysis Basic - ( 2020 22:41 )    Color: Yellow / Appearance: Clear / S.025 / pH: x  Gluc: x / Ketone: NEGATIVE  / Bili: Negative / Urobili: 1.0 E.U./dL   Blood: x / Protein: NEGATIVE mg/dL / Nitrite: POSITIVE   Leuk Esterase: Large / RBC: > 10 /HPF / WBC Many /HPF   Sq Epi: x / Non Sq Epi: 0-5 /HPF / Bacteria: Many /HPF    MEDICATIONS  (STANDING):  azithromycin   Tablet 250 milliGRAM(s) Oral every 24 hours  cefTRIAXone   IVPB 2000 milliGRAM(s) IV Intermittent every 24 hours  enoxaparin Injectable 40 milliGRAM(s) SubCutaneous every 24 hours  famotidine Injectable 20 milliGRAM(s) IV Push every 12 hours  hydroxychloroquine 400 milliGRAM(s) Oral every 12 hours  hydroxychloroquine   Oral   methylPREDNISolone sodium succinate Injectable 40 milliGRAM(s) IV Push every 12 hours  tocilizumab IVPB 400 milliGRAM(s) IV Intermittent once    MEDICATIONS  (PRN):  acetaminophen   Tablet .. 650 milliGRAM(s) Oral every 6 hours PRN Temp greater or equal to 38C (100.4F), Mild Pain (1 - 3)    RADIOLOGY & ADDITIONAL TESTS:  CXR: diffuse b/l patchy infiltrates

## 2020-04-09 LAB
ALBUMIN SERPL ELPH-MCNC: 2.4 G/DL — LOW (ref 3.3–5)
ALP SERPL-CCNC: 104 U/L — SIGNIFICANT CHANGE UP (ref 40–120)
ALT FLD-CCNC: 15 U/L — SIGNIFICANT CHANGE UP (ref 10–45)
ANION GAP SERPL CALC-SCNC: 11 MMOL/L — SIGNIFICANT CHANGE UP (ref 5–17)
AST SERPL-CCNC: 16 U/L — SIGNIFICANT CHANGE UP (ref 10–40)
BASOPHILS # BLD AUTO: 0.03 K/UL — SIGNIFICANT CHANGE UP (ref 0–0.2)
BASOPHILS NFR BLD AUTO: 0.3 % — SIGNIFICANT CHANGE UP (ref 0–2)
BILIRUB SERPL-MCNC: 0.2 MG/DL — SIGNIFICANT CHANGE UP (ref 0.2–1.2)
BUN SERPL-MCNC: 9 MG/DL — SIGNIFICANT CHANGE UP (ref 7–23)
CALCIUM SERPL-MCNC: 8.9 MG/DL — SIGNIFICANT CHANGE UP (ref 8.4–10.5)
CHLORIDE SERPL-SCNC: 105 MMOL/L — SIGNIFICANT CHANGE UP (ref 96–108)
CO2 SERPL-SCNC: 26 MMOL/L — SIGNIFICANT CHANGE UP (ref 22–31)
CREAT SERPL-MCNC: 0.54 MG/DL — SIGNIFICANT CHANGE UP (ref 0.5–1.3)
CRP SERPL-MCNC: 25.06 MG/DL — HIGH (ref 0–0.4)
CULTURE RESULTS: NO GROWTH — SIGNIFICANT CHANGE UP
D DIMER BLD IA.RAPID-MCNC: 634 NG/ML DDU — HIGH
EOSINOPHIL # BLD AUTO: 0 K/UL — SIGNIFICANT CHANGE UP (ref 0–0.5)
EOSINOPHIL NFR BLD AUTO: 0 % — SIGNIFICANT CHANGE UP (ref 0–6)
FERRITIN SERPL-MCNC: 367 NG/ML — HIGH (ref 15–150)
GAMMA INTERFERON BACKGROUND BLD IA-ACNC: 0.01 IU/ML — SIGNIFICANT CHANGE UP
GLUCOSE SERPL-MCNC: 120 MG/DL — HIGH (ref 70–99)
GRAM STN FLD: SIGNIFICANT CHANGE UP
HCT VFR BLD CALC: 39 % — SIGNIFICANT CHANGE UP (ref 34.5–45)
HGB BLD-MCNC: 11.9 G/DL — SIGNIFICANT CHANGE UP (ref 11.5–15.5)
IMM GRANULOCYTES NFR BLD AUTO: 1.3 % — SIGNIFICANT CHANGE UP (ref 0–1.5)
LYMPHOCYTES # BLD AUTO: 0.84 K/UL — LOW (ref 1–3.3)
LYMPHOCYTES # BLD AUTO: 7.3 % — LOW (ref 13–44)
M TB IFN-G BLD-IMP: ABNORMAL
M TB IFN-G CD4+ BCKGRND COR BLD-ACNC: 0 IU/ML — SIGNIFICANT CHANGE UP
M TB IFN-G CD4+CD8+ BCKGRND COR BLD-ACNC: 0 IU/ML — SIGNIFICANT CHANGE UP
MAGNESIUM SERPL-MCNC: 2.4 MG/DL — SIGNIFICANT CHANGE UP (ref 1.6–2.6)
MCHC RBC-ENTMCNC: 27.2 PG — SIGNIFICANT CHANGE UP (ref 27–34)
MCHC RBC-ENTMCNC: 30.5 GM/DL — LOW (ref 32–36)
MCV RBC AUTO: 89 FL — SIGNIFICANT CHANGE UP (ref 80–100)
MONOCYTES # BLD AUTO: 0.72 K/UL — SIGNIFICANT CHANGE UP (ref 0–0.9)
MONOCYTES NFR BLD AUTO: 6.2 % — SIGNIFICANT CHANGE UP (ref 2–14)
NEUTROPHILS # BLD AUTO: 9.8 K/UL — HIGH (ref 1.8–7.4)
NEUTROPHILS NFR BLD AUTO: 84.9 % — HIGH (ref 43–77)
NRBC # BLD: 0 /100 WBCS — SIGNIFICANT CHANGE UP (ref 0–0)
PHOSPHATE SERPL-MCNC: 4 MG/DL — SIGNIFICANT CHANGE UP (ref 2.5–4.5)
PLATELET # BLD AUTO: 548 K/UL — HIGH (ref 150–400)
POTASSIUM SERPL-MCNC: 4.9 MMOL/L — SIGNIFICANT CHANGE UP (ref 3.5–5.3)
POTASSIUM SERPL-SCNC: 4.9 MMOL/L — SIGNIFICANT CHANGE UP (ref 3.5–5.3)
PROT SERPL-MCNC: 6.7 G/DL — SIGNIFICANT CHANGE UP (ref 6–8.3)
QUANT TB PLUS MITOGEN MINUS NIL: 0.09 IU/ML — SIGNIFICANT CHANGE UP
RBC # BLD: 4.38 M/UL — SIGNIFICANT CHANGE UP (ref 3.8–5.2)
RBC # FLD: 14.4 % — SIGNIFICANT CHANGE UP (ref 10.3–14.5)
SODIUM SERPL-SCNC: 142 MMOL/L — SIGNIFICANT CHANGE UP (ref 135–145)
SPECIMEN SOURCE: SIGNIFICANT CHANGE UP
WBC # BLD: 11.54 K/UL — HIGH (ref 3.8–10.5)
WBC # FLD AUTO: 11.54 K/UL — HIGH (ref 3.8–10.5)

## 2020-04-09 PROCEDURE — 99233 SBSQ HOSP IP/OBS HIGH 50: CPT

## 2020-04-09 PROCEDURE — 71045 X-RAY EXAM CHEST 1 VIEW: CPT | Mod: 26

## 2020-04-09 RX ORDER — CEFTRIAXONE 500 MG/1
2000 INJECTION, POWDER, FOR SOLUTION INTRAMUSCULAR; INTRAVENOUS EVERY 24 HOURS
Refills: 0 | Status: COMPLETED | OUTPATIENT
Start: 2020-04-09 | End: 2020-04-11

## 2020-04-09 RX ORDER — SODIUM CHLORIDE 9 MG/ML
1000 INJECTION INTRAMUSCULAR; INTRAVENOUS; SUBCUTANEOUS ONCE
Refills: 0 | Status: COMPLETED | OUTPATIENT
Start: 2020-04-09 | End: 2020-04-09

## 2020-04-09 RX ORDER — SODIUM CHLORIDE 9 MG/ML
1000 INJECTION INTRAMUSCULAR; INTRAVENOUS; SUBCUTANEOUS
Refills: 0 | Status: COMPLETED | OUTPATIENT
Start: 2020-04-09 | End: 2020-04-09

## 2020-04-09 RX ORDER — SODIUM CHLORIDE 9 MG/ML
1000 INJECTION INTRAMUSCULAR; INTRAVENOUS; SUBCUTANEOUS
Refills: 0 | Status: DISCONTINUED | OUTPATIENT
Start: 2020-04-09 | End: 2020-04-09

## 2020-04-09 RX ORDER — SACCHAROMYCES BOULARDII 250 MG
250 POWDER IN PACKET (EA) ORAL
Refills: 0 | Status: DISCONTINUED | OUTPATIENT
Start: 2020-04-09 | End: 2020-04-15

## 2020-04-09 RX ADMIN — Medication 400 MILLIGRAM(S): at 01:11

## 2020-04-09 RX ADMIN — AZITHROMYCIN 250 MILLIGRAM(S): 500 TABLET, FILM COATED ORAL at 19:25

## 2020-04-09 RX ADMIN — SODIUM CHLORIDE 70 MILLILITER(S): 9 INJECTION INTRAMUSCULAR; INTRAVENOUS; SUBCUTANEOUS at 08:00

## 2020-04-09 RX ADMIN — Medication 40 MILLIGRAM(S): at 06:20

## 2020-04-09 RX ADMIN — FAMOTIDINE 20 MILLIGRAM(S): 10 INJECTION INTRAVENOUS at 06:20

## 2020-04-09 RX ADMIN — FAMOTIDINE 20 MILLIGRAM(S): 10 INJECTION INTRAVENOUS at 19:25

## 2020-04-09 RX ADMIN — ENOXAPARIN SODIUM 40 MILLIGRAM(S): 100 INJECTION SUBCUTANEOUS at 06:20

## 2020-04-09 RX ADMIN — Medication 250 MILLIGRAM(S): at 19:25

## 2020-04-09 RX ADMIN — CEFTRIAXONE 2000 MILLIGRAM(S): 500 INJECTION, POWDER, FOR SOLUTION INTRAMUSCULAR; INTRAVENOUS at 21:11

## 2020-04-09 RX ADMIN — Medication 40 MILLIGRAM(S): at 19:25

## 2020-04-09 RX ADMIN — SODIUM CHLORIDE 1000 MILLILITER(S): 9 INJECTION INTRAMUSCULAR; INTRAVENOUS; SUBCUTANEOUS at 15:59

## 2020-04-09 RX ADMIN — Medication 200 MILLIGRAM(S): at 13:00

## 2020-04-09 RX ADMIN — Medication 5 MILLIGRAM(S): at 13:00

## 2020-04-09 NOTE — CHART NOTE - NSCHARTNOTEFT_GEN_A_CORE
Admitting Diagnosis:   Patient is a 69y old  Female who presents with a chief complaint of shortness of breath (09 Apr 2020 15:31)      Consult: Yes [   ]  No [ x  ]    Reason for Initial Nutrition Assessment: LOS       PAST MEDICAL & SURGICAL HISTORY:  Eye problem: right eye  Pulmonary embolism: post knee replacement 5 years ago  H/o total knee replacement, right: 2012      Current Nutrition Order: Regular, Kosher     PO Intake: Fair ~50%    GI Issues: pt denies n/v/d/c     Pain: pt denies pain     Skin Integrity: intact     Labs:   04-09    142  |  105  |  9   ----------------------------<  120<H>  4.9   |  26  |  0.54    Ca    8.9      09 Apr 2020 07:19  Phos  4.0     04-09  Mg     2.4     04-09    TPro  6.7  /  Alb  2.4<L>  /  TBili  0.2  /  DBili  x   /  AST  16  /  ALT  15  /  AlkPhos  104  04-09    CAPILLARY BLOOD GLUCOSE        Nutritionally Pertinent Lab Values: (4/9) CRP 25.06 (up-trending)     Medications:  MEDICATIONS  (STANDING):  azithromycin   Tablet 250 milliGRAM(s) Oral every 24 hours  cefTRIAXone Injectable. 2000 milliGRAM(s) IV Push every 24 hours  enoxaparin Injectable 40 milliGRAM(s) SubCutaneous every 24 hours  famotidine Injectable 20 milliGRAM(s) IV Push every 12 hours  hydroxychloroquine 200 milliGRAM(s) Oral every 12 hours  hydroxychloroquine   Oral   methylPREDNISolone sodium succinate Injectable 40 milliGRAM(s) IV Push every 12 hours  oxybutynin 5 milliGRAM(s) Oral daily  sodium chloride 0.9%. 1000 milliLiter(s) (70 mL/Hr) IV Continuous <Continuous>    MEDICATIONS  (PRN):  acetaminophen   Tablet .. 650 milliGRAM(s) Oral every 6 hours PRN Temp greater or equal to 38C (100.4F), Mild Pain (1 - 3)    Subjective:   Pt with no significant past medical history.  Pt presents with cough and SOB x3 weeks; acute hypoxic respiratory failure - r/o COVID vs bacterial PNA.    Unable to conduct a face to face interview or nutrition-focused physical exam due to limited contact restrictions related to the pt's medical condition and isolation precautions.  Spoke to patient over the room phone.  Pt remains very lethargic; can hear her very SOB over the phone.  Pt remains on 15L NRB.  Team has goal of pt being prone x2 hrs to improve O2 saturations, however, pt is not tolerating it for very long.    Pt endorses good appetite/intake at baseline; consuming a Regular, kosher diet prior to admission.  Pt with UBW ~162lbs.  Pt reports decreased appetite/intake x3 weeks prior to admission.  Pt with ~6lb weight loss which pt attributes to decreased PO intake.  Inadequate energy intake and ~4% weight loss x3 weeks is concerning for moderate malnutrition in the setting of acute illness.  Pt endorses fair appetite at this time; consuming ~50% of meals.  Preferences obtained through dinner tomorrow; entered in  system and communicated to .  Pt was very appreciative.      Admitted Anthropometrics: (4/7) 70.8kg; Ht 167.64cm; BMI 25.2; IBW 59.1kg; %%    Weight Change: no additional weights documented for comparison     Nutrition Focused Physical Exam: Completed [   ]  Unable to complete [  x ]    Estimated energy needs:   ABW used to calculate energy needs due to pt's current body weight within % IBW  Needs calculated for age and increased secondary to COVID+/infection/inflammation and concern for malnutrition  Energy: 1770-2124kcal (25-30cal/kg)  Protein: 85-99g pro (1.2-1.4g/kg pro)  Fluids per team    Nutrition Diagnosis: Increased nutrient needs r/t increased demand secondary to prematurity AEB COVID+/infection/inflammation and concern for malnutrition     Goal: Pt to consistently meet at least 75% estimated nutrient needs     Recommendations:   1. Monitor PO intake  2. Honor food preferences  3. Appreciate continued assistance and encouragement with meals  4. Add Ensure Enlive BID (700kcal, 40g pro)    Education: Encouraged kcal/pro/fluid intake, as tolerated, to best meet increased nutrient requirements at this time    Risk Level: High [   ] Moderate [ x  ] Low [   ]

## 2020-04-09 NOTE — PROGRESS NOTE ADULT - SUBJECTIVE AND OBJECTIVE BOX
Patient discussed on morning rounds with Dr. Fisher    SUBJECTIVE ASSESSMENT:    Pt reports breathing is comfortable at rest on O2, but she quickly becomes dyspnic with any exertion or when taking off NRB to eat.  She also c/o fatigue and malaise slightly improved as compared to yesterday.    Vital Signs Last 24 Hrs  T(C): 36.2 (2020 09:30), Max: 37 (2020 16:20)  T(F): 97.2 (2020 09:30), Max: 98.6 (2020 16:20)  HR: 96 (2020 09:30) (63 - 97)  BP: 118/63 (2020 09:30) (101/59 - 118/63)  BP(mean): --  RR: 21 (2020 09:30) (21 - 27)  SpO2: 92% (2020 09:30) (92% - 95%)  I&O's Detail    2020 07:01  -  2020 07:00  --------------------------------------------------------  IN:    IV PiggyBack: 150 mL    Oral Fluid: 550 mL  Total IN: 700 mL    OUT:    Voided: 1050 mL  Total OUT: 1050 mL    Total NET: -350 mL    PHYSICAL EXAM:    General: NAD  Neurological: A&Ox3  Cardiovascular: s1S2 RRR  Respiratory: breathing comfortably at rest, no accessory muscle use  Gastrointestinal: soft NT/ND  Extremities: no edema  Vascular: warm and well perfused bilaterally    LABS:                        11.9   11.54 )-----------( 548      ( 2020 07:19 )             39.0       PT/INR - ( 2020 20:06 )   PT: 19.1 sec;   INR: 1.65          PTT - ( 2020 20:06 )  PTT:32.6 sec    04-09    142  |  105  |  9   ----------------------------<  120<H>  4.9   |  26  |  0.54    Ca    8.9      2020 07:19  Phos  4.0     04-09  Mg     2.4         TPro  6.7  /  Alb  2.4<L>  /  TBili  0.2  /  DBili  x   /  AST  16  /  ALT  15  /  AlkPhos  104        Urinalysis Basic - ( 2020 22:41 )    Color: Yellow / Appearance: Clear / S.025 / pH: x  Gluc: x / Ketone: NEGATIVE  / Bili: Negative / Urobili: 1.0 E.U./dL   Blood: x / Protein: NEGATIVE mg/dL / Nitrite: POSITIVE   Leuk Esterase: Large / RBC: > 10 /HPF / WBC Many /HPF   Sq Epi: x / Non Sq Epi: 0-5 /HPF / Bacteria: Many /HPF      MEDICATIONS  (STANDING):  azithromycin   Tablet 250 milliGRAM(s) Oral every 24 hours  cefTRIAXone Injectable. 2000 milliGRAM(s) IV Push every 24 hours  enoxaparin Injectable 40 milliGRAM(s) SubCutaneous every 24 hours  famotidine Injectable 20 milliGRAM(s) IV Push every 12 hours  hydroxychloroquine 200 milliGRAM(s) Oral every 12 hours  hydroxychloroquine   Oral   methylPREDNISolone sodium succinate Injectable 40 milliGRAM(s) IV Push every 12 hours  oxybutynin 5 milliGRAM(s) Oral daily  sodium chloride 0.9% Bolus 1000 milliLiter(s) IV Bolus once  sodium chloride 0.9%. 1000 milliLiter(s) (70 mL/Hr) IV Continuous <Continuous>    MEDICATIONS  (PRN):  acetaminophen   Tablet .. 650 milliGRAM(s) Oral every 6 hours PRN Temp greater or equal to 38C (100.4F), Mild Pain (1 - 3)    RADIOLOGY & ADDITIONAL TESTS:    CXR: no effusions/infiltrates/PTX

## 2020-04-09 NOTE — CHART NOTE - NSCHARTNOTEFT_GEN_A_CORE
Upon Nutritional Assessment by the Registered Dietitian your patient was determined to meet criteria / has evidence of the following diagnosis/diagnoses:          [ ]  Mild Protein Calorie Malnutrition        [x ]  Moderate Protein Calorie Malnutrition        [ ] Severe Protein Calorie Malnutrition        [ ] Unspecified Protein Calorie Malnutrition        [ ] Underweight / BMI <19        [ ] Morbid Obesity / BMI > 40      Findings as based on:  •  Comprehensive nutrition assessment and consultation    Treatment:    Pt reports decreased appetite/intake x3 weeks prior to admission.  Pt with ~6lb weight loss which pt attributes to decreased PO intake.  Inadequate energy intake and ~4% weight loss x3 weeks is concerning for moderate malnutrition in the setting of acute illness.    PROVIDER Section:     By signing this assessment you are acknowledging and agree with the diagnosis/diagnoses assigned by the Registered Dietitian    Comments:

## 2020-04-09 NOTE — PROGRESS NOTE ADULT - ASSESSMENT
69F PMH PE (provoked after knee replacement surgery 7 years ago, no longer on AC other than ASA), presents with 3 weeks of myalgias, cough, and progressive SOB admitted for acute hypoxic respiratory failure in the setting of sepsis secondary to bacterial pneumona v. COVID.     Problem/Plan - 1:  ·  Problem: Severe sepsis.  Plan: Patient presenting w/fever to 101.4, tachycardia to 145, RR 42, leukocytosis, Lactate 2.5. Likely source pulmonary in the setting of respiratory distress. Differentials include viral pneumonia (?COVID in the setting of bilateral patchy opacities on xray and elevated inflammatory markers)  vs. bacterial pneumonia given findings of nodular infiltrates on CXR and COVID neg on initial swab   - s/p azithromycin/Zosyn in the ED   - continue with ceftriaxone 2g [id number].      Problem/Plan - 2:  ·  Problem: Acute hypoxemic respiratory failure.  Plan: Patient presenting hypoxic to the 60s on RA, now sating 97% on NRB, nontachypneic with associated weakness/lethargy and findings extensive bilateral patchy and reticular nodular infiltrates CXR concerning for possible COVID  - currently saturating 97% on NRB, no respiratory distress, not using accessory muscles, no increased work of breathing   - continue to monitor respiratory status  - COVID PCR negative x2; but high clinical suspicion.  - will empirically start on azithromycin/plaquinil given clinical status  - s/p azithromycin/zosyn in the ED   - continue ceftriaxone 2g q24hrs for bacterial pneumonia coverage  - trend inflammatory markers.      Problem/Plan - 3:  ·  Problem: R/O SARS-associated coronavirus infection.  Plan: patient presenting with 3 weeks of cough, fatigue, decreased appetite and progrssive SOB. CXR showing extensive bilateral patchy and reticular nodular infiltrates. COVID neg. Possible infiltrate on CXR can be potentially superimposed bacterial pneumonia given septic presentation and initial COVID neg, however procalcitonin only mildly elevated to 0.5  - pt febrile in ED to 101.4; increasing oxygen requirements while in the ED from 60s% on RA to 94% 15L NRB  - CRP 19.76, D-dimer 749, Ferritin 307, Fibrinogen 605 lactate 2.5,   - started hydroxychloroquine and azithromycin 500mg   - s/p azithromycin/zosyn in the ED   - continue ceftraizone 2g q24hrs for bacterial pneumonia coverage  - Continue to trend daily LFTs, ferritin, LDH, CRP, procalcitonin  - Tylenol PRN for fever, avoid NSAIDs  - Supplemental O2 via NRB 15L saturating 97%.   - Sputum cx sent to [facility] for COVID PCR, f/u results      Problem/Plan - 4:  ·  Problem: History of pulmonary embolus (PE).  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 Previously was on Coumadin, now on aspirin.   - Presenting with tachycardia to 140s, however low suspicion of PE, given no lower extremity edema and pain on inspiration   - c/w lovenox 40 q24     Problem/Plan - 5:  ·  Problem: Prophylactic measure.  Plan: F: none  E: Replete K<4, Mg<2  N: Regular Diet.

## 2020-04-10 LAB
-  AMPICILLIN/SULBACTAM: SIGNIFICANT CHANGE UP
-  AMPICILLIN: SIGNIFICANT CHANGE UP
-  CEFAZOLIN: SIGNIFICANT CHANGE UP
-  CEFTRIAXONE: SIGNIFICANT CHANGE UP
-  GENTAMICIN: SIGNIFICANT CHANGE UP
-  NITROFURANTOIN: SIGNIFICANT CHANGE UP
-  PIPERACILLIN/TAZOBACTAM: SIGNIFICANT CHANGE UP
-  TOBRAMYCIN: SIGNIFICANT CHANGE UP
-  TRIMETHOPRIM/SULFAMETHOXAZOLE: SIGNIFICANT CHANGE UP
ALBUMIN SERPL ELPH-MCNC: 2.4 G/DL — LOW (ref 3.3–5)
ALP SERPL-CCNC: 68 U/L — SIGNIFICANT CHANGE UP (ref 40–120)
ALT FLD-CCNC: 15 U/L — SIGNIFICANT CHANGE UP (ref 10–45)
ANION GAP SERPL CALC-SCNC: 11 MMOL/L — SIGNIFICANT CHANGE UP (ref 5–17)
AST SERPL-CCNC: 14 U/L — SIGNIFICANT CHANGE UP (ref 10–40)
BASOPHILS # BLD AUTO: 0.03 K/UL — SIGNIFICANT CHANGE UP (ref 0–0.2)
BASOPHILS NFR BLD AUTO: 0.2 % — SIGNIFICANT CHANGE UP (ref 0–2)
BILIRUB SERPL-MCNC: 0.2 MG/DL — SIGNIFICANT CHANGE UP (ref 0.2–1.2)
BUN SERPL-MCNC: 12 MG/DL — SIGNIFICANT CHANGE UP (ref 7–23)
CALCIUM SERPL-MCNC: 8.7 MG/DL — SIGNIFICANT CHANGE UP (ref 8.4–10.5)
CHLORIDE SERPL-SCNC: 107 MMOL/L — SIGNIFICANT CHANGE UP (ref 96–108)
CO2 SERPL-SCNC: 23 MMOL/L — SIGNIFICANT CHANGE UP (ref 22–31)
CREAT SERPL-MCNC: 0.53 MG/DL — SIGNIFICANT CHANGE UP (ref 0.5–1.3)
CRP SERPL-MCNC: 3.72 MG/DL — HIGH (ref 0–0.4)
CRP SERPL-MCNC: 8.61 MG/DL — HIGH (ref 0–0.4)
D DIMER BLD IA.RAPID-MCNC: 605 NG/ML DDU — HIGH
EOSINOPHIL # BLD AUTO: 0 K/UL — SIGNIFICANT CHANGE UP (ref 0–0.5)
EOSINOPHIL NFR BLD AUTO: 0 % — SIGNIFICANT CHANGE UP (ref 0–6)
FERRITIN SERPL-MCNC: 406 NG/ML — HIGH (ref 15–150)
G6PD RBC-CCNC: 19 U/G HGB — SIGNIFICANT CHANGE UP (ref 7–20.5)
GLUCOSE SERPL-MCNC: 108 MG/DL — HIGH (ref 70–99)
HCT VFR BLD CALC: 42.9 % — SIGNIFICANT CHANGE UP (ref 34.5–45)
HGB BLD-MCNC: 12.6 G/DL — SIGNIFICANT CHANGE UP (ref 11.5–15.5)
IMM GRANULOCYTES NFR BLD AUTO: 1 % — SIGNIFICANT CHANGE UP (ref 0–1.5)
LYMPHOCYTES # BLD AUTO: 1.34 K/UL — SIGNIFICANT CHANGE UP (ref 1–3.3)
LYMPHOCYTES # BLD AUTO: 8.7 % — LOW (ref 13–44)
MAGNESIUM SERPL-MCNC: 2.2 MG/DL — SIGNIFICANT CHANGE UP (ref 1.6–2.6)
MCHC RBC-ENTMCNC: 26.5 PG — LOW (ref 27–34)
MCHC RBC-ENTMCNC: 29.4 GM/DL — LOW (ref 32–36)
MCV RBC AUTO: 90.1 FL — SIGNIFICANT CHANGE UP (ref 80–100)
METHOD TYPE: SIGNIFICANT CHANGE UP
MONOCYTES # BLD AUTO: 0.89 K/UL — SIGNIFICANT CHANGE UP (ref 0–0.9)
MONOCYTES NFR BLD AUTO: 5.8 % — SIGNIFICANT CHANGE UP (ref 2–14)
NEUTROPHILS # BLD AUTO: 13.03 K/UL — HIGH (ref 1.8–7.4)
NEUTROPHILS NFR BLD AUTO: 84.3 % — HIGH (ref 43–77)
NRBC # BLD: 0 /100 WBCS — SIGNIFICANT CHANGE UP (ref 0–0)
PLATELET # BLD AUTO: 686 K/UL — HIGH (ref 150–400)
POTASSIUM SERPL-MCNC: 4.2 MMOL/L — SIGNIFICANT CHANGE UP (ref 3.5–5.3)
POTASSIUM SERPL-SCNC: 4.2 MMOL/L — SIGNIFICANT CHANGE UP (ref 3.5–5.3)
PROT SERPL-MCNC: 6.5 G/DL — SIGNIFICANT CHANGE UP (ref 6–8.3)
RBC # BLD: 4.76 M/UL — SIGNIFICANT CHANGE UP (ref 3.8–5.2)
RBC # FLD: 14.2 % — SIGNIFICANT CHANGE UP (ref 10.3–14.5)
SARS-COV-2 RNA SPEC QL NAA+PROBE: DETECTED
SARS-COV-2 RNA SPEC QL NAA+PROBE: SIGNIFICANT CHANGE UP
SODIUM SERPL-SCNC: 141 MMOL/L — SIGNIFICANT CHANGE UP (ref 135–145)
WBC # BLD: 15.45 K/UL — HIGH (ref 3.8–10.5)
WBC # FLD AUTO: 15.45 K/UL — HIGH (ref 3.8–10.5)

## 2020-04-10 PROCEDURE — 99233 SBSQ HOSP IP/OBS HIGH 50: CPT

## 2020-04-10 RX ORDER — SODIUM CHLORIDE 9 MG/ML
1000 INJECTION INTRAMUSCULAR; INTRAVENOUS; SUBCUTANEOUS ONCE
Refills: 0 | Status: COMPLETED | OUTPATIENT
Start: 2020-04-10 | End: 2020-04-10

## 2020-04-10 RX ADMIN — Medication 5 MILLIGRAM(S): at 11:58

## 2020-04-10 RX ADMIN — Medication 0.5 MILLIGRAM(S): at 21:25

## 2020-04-10 RX ADMIN — CEFTRIAXONE 2000 MILLIGRAM(S): 500 INJECTION, POWDER, FOR SOLUTION INTRAMUSCULAR; INTRAVENOUS at 21:24

## 2020-04-10 RX ADMIN — Medication 250 MILLIGRAM(S): at 18:05

## 2020-04-10 RX ADMIN — Medication 40 MILLIGRAM(S): at 05:51

## 2020-04-10 RX ADMIN — SODIUM CHLORIDE 1000 MILLILITER(S): 9 INJECTION INTRAMUSCULAR; INTRAVENOUS; SUBCUTANEOUS at 11:58

## 2020-04-10 RX ADMIN — AZITHROMYCIN 250 MILLIGRAM(S): 500 TABLET, FILM COATED ORAL at 21:25

## 2020-04-10 RX ADMIN — ENOXAPARIN SODIUM 40 MILLIGRAM(S): 100 INJECTION SUBCUTANEOUS at 05:05

## 2020-04-10 RX ADMIN — Medication 250 MILLIGRAM(S): at 06:51

## 2020-04-10 RX ADMIN — Medication 40 MILLIGRAM(S): at 18:05

## 2020-04-10 RX ADMIN — Medication 200 MILLIGRAM(S): at 15:22

## 2020-04-10 RX ADMIN — Medication 650 MILLIGRAM(S): at 21:36

## 2020-04-10 RX ADMIN — Medication 200 MILLIGRAM(S): at 06:50

## 2020-04-10 RX ADMIN — FAMOTIDINE 20 MILLIGRAM(S): 10 INJECTION INTRAVENOUS at 05:50

## 2020-04-10 NOTE — PROGRESS NOTE ADULT - SUBJECTIVE AND OBJECTIVE BOX
History of Present Illness: 	  69F PMH PE (provoked after knee replacement surgery 7 years ago, no longer on AC other than ASA), presents with 3 weeks of myalgias, cough, and progressive shortness of breath. She says that initially her symptoms started with sore throat and an earache which has resolved in a week. Then, she developed myalgias and a cough which are still ongoing. She says that during this time, she spoke with her PCP over the phone who recommended supportive care and did not prescribe any antibiotics She noticed that her breathing progressively worsened and she decided to seek medical attention. She denies any recent travel or sick contacts. She currently denies any chills, chest pain, abdominal pain, n/v/d/c, edema.       Interval Events:  Patient seen and examined at bedside.      Allergies    No Known Allergies    Intolerances        Vital Signs Last 24 Hrs  T(C): 36.4 (10 Apr 2020 09:10), Max: 36.4 (10 Apr 2020 09:10)  T(F): 97.6 (10 Apr 2020 09:10), Max: 97.6 (10 Apr 2020 09:10)  HR: 92 (10 Apr 2020 09:10) (84 - 98)  BP: 127/58 (10 Apr 2020 09:10) (100/54 - 141/77)  BP(mean): --  RR: 20 (10 Apr 2020 09:10) (19 - 20)  SpO2: 89% (10 Apr 2020 09:10) (85% - 93%)    04-09 @ 07:01  -  04-10 @ 07:00  --------------------------------------------------------  IN: 0 mL / OUT: 1100 mL / NET: -1100 mL    04-10 @ 07:01  -  04-10 @ 16:27  --------------------------------------------------------  IN: 0 mL / OUT: 400 mL / NET: -400 mL      04-09 @ 07:01  -  04-10 @ 07:00  --------------------------------------------------------  IN: 0 mL / OUT: 1100 mL / NET: -1100 mL    04-10 @ 07:01  -  04-10 @ 16:27  --------------------------------------------------------  IN: 0 mL / OUT: 400 mL / NET: -400 mL      PHYSICAL EXAM:    General: comfortable in NAD  Neurological: non focal  Cardiovascular: NSR  Respiratory: breathing comfortably at rest, no accessory muscle use  Gastrointestinal: soft NT/ND  Extremities: no edema  Vascular: warm and well perfused bilaterally        LABS:    CBC Full  -  ( 10 Apr 2020 06:46 )  WBC Count : 15.45 K/uL  RBC Count : 4.76 M/uL  Hemoglobin : 12.6 g/dL  Hematocrit : 42.9 %  Platelet Count - Automated : 686 K/uL  Mean Cell Volume : 90.1 fl  Mean Cell Hemoglobin : 26.5 pg  Mean Cell Hemoglobin Concentration : 29.4 gm/dL  Auto Neutrophil # : 13.03 K/uL  Auto Lymphocyte # : 1.34 K/uL  Auto Monocyte # : 0.89 K/uL  Auto Eosinophil # : 0.00 K/uL  Auto Basophil # : 0.03 K/uL  Auto Neutrophil % : 84.3 %  Auto Lymphocyte % : 8.7 %  Auto Monocyte % : 5.8 %  Auto Eosinophil % : 0.0 %  Auto Basophil % : 0.2 %    04-10    141  |  107  |  12  ----------------------------<  108<H>  4.2   |  23  |  0.53    Ca    8.7      10 Apr 2020 06:46  Phos  4.0     04-09  Mg     2.2     04-10    TPro  6.5  /  Alb  2.4<L>  /  TBili  0.2  /  DBili  x   /  AST  14  /  ALT  15  /  AlkPhos  68  04-10                    RADIOLOGY & ADDITIONAL STUDIES (The following images were personally reviewed):            · Assessment/Plan:		    69F PMH PE (provoked after knee replacement surgery 7 years ago, no longer on AC other than ASA), presents with 3 weeks of myalgias, cough, and progressive SOB admitted for acute hypoxic respiratory failure in the setting of sepsis secondary to bacterial pneumona v. COVID.     Problem/Plan - 1:  ·  Problem: Severe sepsis.  Plan: Patient presenting w/fever to 101.4, tachycardia to 145, RR 42, leukocytosis, Lactate 2.5. Likely source pulmonary in the setting of respiratory distress. Differentials include viral pneumonia (?COVID in the setting of bilateral patchy opacities on xray and elevated inflammatory markers)  vs. bacterial pneumonia given findings of nodular infiltrates on CXR and COVID neg on initial swab   - s/p azithromycin/Zosyn in the ED   - continue with ceftriaxone 2g [id number].      Problem/Plan - 2:  ·  Problem: Acute hypoxemic respiratory failure.  Plan: Patient presenting hypoxic to the 60s on RA, now sating 97% on NRB, non tachypneic with associated weakness/lethargy and findings extensive bilateral patchy and reticular nodular infiltrates CXR concerning for possible COVID  - currently saturating 97% on NRB, no respiratory distress, not using accessory muscles, no increased work of breathing   - continue to monitor respiratory status  - COVID PCR negative x2; but high clinical suspicion.  - will empirically start on azithromycin/Plaquenil given clinical status  - s/p azithromycin/zosyn in the ED   - continue ceftriaxone 2g q24hrs for bacterial pneumonia coverage  - trend inflammatory markers.   -COVID PCR test 3rd test ordered     Problem/Plan - 3:  ·  Problem: R/O SARS-associated coronavirus infection.  Plan: patient presenting with 3 weeks of cough, fatigue, decreased appetite and progrssive SOB. CXR showing extensive bilateral patchy and reticular nodular infiltrates. COVID neg. Possible infiltrate on CXR can be potentially superimposed bacterial pneumonia given septic presentation and initial COVID neg, however procalcitonin only mildly elevated to 0.5  - pt febrile in ED to 101.4; increasing oxygen requirements while in the ED from 60s% on RA to 94% 15L NRB  - CRP 19.76, D-dimer 749, Ferritin 307, Fibrinogen 605 lactate 2.5,   - started hydroxychloroquine and azithromycin 500mg   - s/p azithromycin/zosyn in the ED   - continue ceftriaxone 2g q24hrs for bacterial pneumonia coverage  - Continue to trend daily LFTs, ferritin, LDH, CRP, procalcitonin  - Tylenol PRN for fever, avoid NSAIDs  - Supplemental O2 via NRB 15L saturating 97%.   - Sputum cx sent to [facility] for COVID PCR, f/u results      Problem/Plan - 4:  ·  Problem: History of pulmonary embolus (PE).  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 Previously was on Coumadin, now on aspirin.   - Presenting with tachycardia to 140s, however low suspicion of PE, given no lower extremity edema and pain on inspiration   - c/w Lovenox 40 q24     Problem/Plan - 5:  ·  Problem: Prophylactic measure.  Plan: F: none  E: Replete K<4, Mg<2  N: Regular Diet.

## 2020-04-11 LAB
-  CIPROFLOXACIN: SIGNIFICANT CHANGE UP
ALBUMIN SERPL ELPH-MCNC: 2.4 G/DL — LOW (ref 3.3–5)
ALP SERPL-CCNC: 58 U/L — SIGNIFICANT CHANGE UP (ref 40–120)
ALT FLD-CCNC: 14 U/L — SIGNIFICANT CHANGE UP (ref 10–45)
ANION GAP SERPL CALC-SCNC: 7 MMOL/L — SIGNIFICANT CHANGE UP (ref 5–17)
AST SERPL-CCNC: 10 U/L — SIGNIFICANT CHANGE UP (ref 10–40)
BASOPHILS # BLD AUTO: 0.03 K/UL — SIGNIFICANT CHANGE UP (ref 0–0.2)
BASOPHILS NFR BLD AUTO: 0.3 % — SIGNIFICANT CHANGE UP (ref 0–2)
BILIRUB SERPL-MCNC: <0.2 MG/DL — SIGNIFICANT CHANGE UP (ref 0.2–1.2)
BUN SERPL-MCNC: 14 MG/DL — SIGNIFICANT CHANGE UP (ref 7–23)
CALCIUM SERPL-MCNC: 8.9 MG/DL — SIGNIFICANT CHANGE UP (ref 8.4–10.5)
CHLORIDE SERPL-SCNC: 107 MMOL/L — SIGNIFICANT CHANGE UP (ref 96–108)
CO2 SERPL-SCNC: 26 MMOL/L — SIGNIFICANT CHANGE UP (ref 22–31)
CREAT SERPL-MCNC: 0.62 MG/DL — SIGNIFICANT CHANGE UP (ref 0.5–1.3)
CULTURE RESULTS: NO GROWTH — SIGNIFICANT CHANGE UP
CULTURE RESULTS: NO GROWTH — SIGNIFICANT CHANGE UP
CULTURE RESULTS: SIGNIFICANT CHANGE UP
D DIMER BLD IA.RAPID-MCNC: 555 NG/ML DDU — HIGH
EOSINOPHIL # BLD AUTO: 0.01 K/UL — SIGNIFICANT CHANGE UP (ref 0–0.5)
EOSINOPHIL NFR BLD AUTO: 0.1 % — SIGNIFICANT CHANGE UP (ref 0–6)
ERYTHROCYTE [SEDIMENTATION RATE] IN BLOOD: 52 MM/HR — HIGH
FERRITIN SERPL-MCNC: 293 NG/ML — HIGH (ref 15–150)
GLUCOSE BLDC GLUCOMTR-MCNC: 82 MG/DL — SIGNIFICANT CHANGE UP (ref 70–99)
GLUCOSE SERPL-MCNC: 106 MG/DL — HIGH (ref 70–99)
HCT VFR BLD CALC: 39.3 % — SIGNIFICANT CHANGE UP (ref 34.5–45)
HGB BLD-MCNC: 12 G/DL — SIGNIFICANT CHANGE UP (ref 11.5–15.5)
IMM GRANULOCYTES NFR BLD AUTO: 1 % — SIGNIFICANT CHANGE UP (ref 0–1.5)
LACTATE SERPL-SCNC: 1.5 MMOL/L — SIGNIFICANT CHANGE UP (ref 0.5–2)
LYMPHOCYTES # BLD AUTO: 1.18 K/UL — SIGNIFICANT CHANGE UP (ref 1–3.3)
LYMPHOCYTES # BLD AUTO: 13.4 % — SIGNIFICANT CHANGE UP (ref 13–44)
MAGNESIUM SERPL-MCNC: 2.1 MG/DL — SIGNIFICANT CHANGE UP (ref 1.6–2.6)
MCHC RBC-ENTMCNC: 27.1 PG — SIGNIFICANT CHANGE UP (ref 27–34)
MCHC RBC-ENTMCNC: 30.5 GM/DL — LOW (ref 32–36)
MCV RBC AUTO: 88.7 FL — SIGNIFICANT CHANGE UP (ref 80–100)
METHOD TYPE: SIGNIFICANT CHANGE UP
MONOCYTES # BLD AUTO: 0.51 K/UL — SIGNIFICANT CHANGE UP (ref 0–0.9)
MONOCYTES NFR BLD AUTO: 5.8 % — SIGNIFICANT CHANGE UP (ref 2–14)
NEUTROPHILS # BLD AUTO: 6.99 K/UL — SIGNIFICANT CHANGE UP (ref 1.8–7.4)
NEUTROPHILS NFR BLD AUTO: 79.4 % — HIGH (ref 43–77)
NRBC # BLD: 0 /100 WBCS — SIGNIFICANT CHANGE UP (ref 0–0)
ORGANISM # SPEC MICROSCOPIC CNT: SIGNIFICANT CHANGE UP
PHOSPHATE SERPL-MCNC: 3.6 MG/DL — SIGNIFICANT CHANGE UP (ref 2.5–4.5)
PLATELET # BLD AUTO: 523 K/UL — HIGH (ref 150–400)
POTASSIUM SERPL-MCNC: 4.7 MMOL/L — SIGNIFICANT CHANGE UP (ref 3.5–5.3)
POTASSIUM SERPL-SCNC: 4.7 MMOL/L — SIGNIFICANT CHANGE UP (ref 3.5–5.3)
PROCALCITONIN SERPL-MCNC: 0.04 NG/ML — SIGNIFICANT CHANGE UP (ref 0.02–0.1)
PROT SERPL-MCNC: 5.8 G/DL — LOW (ref 6–8.3)
RBC # BLD: 4.43 M/UL — SIGNIFICANT CHANGE UP (ref 3.8–5.2)
RBC # FLD: 14.4 % — SIGNIFICANT CHANGE UP (ref 10.3–14.5)
SODIUM SERPL-SCNC: 140 MMOL/L — SIGNIFICANT CHANGE UP (ref 135–145)
SPECIMEN SOURCE: SIGNIFICANT CHANGE UP
WBC # BLD: 8.81 K/UL — SIGNIFICANT CHANGE UP (ref 3.8–10.5)
WBC # FLD AUTO: 8.81 K/UL — SIGNIFICANT CHANGE UP (ref 3.8–10.5)

## 2020-04-11 PROCEDURE — 99233 SBSQ HOSP IP/OBS HIGH 50: CPT

## 2020-04-11 RX ORDER — FAMOTIDINE 10 MG/ML
20 INJECTION INTRAVENOUS
Refills: 0 | Status: DISCONTINUED | OUTPATIENT
Start: 2020-04-11 | End: 2020-04-12

## 2020-04-11 RX ADMIN — Medication 650 MILLIGRAM(S): at 19:41

## 2020-04-11 RX ADMIN — AZITHROMYCIN 250 MILLIGRAM(S): 500 TABLET, FILM COATED ORAL at 19:41

## 2020-04-11 RX ADMIN — Medication 250 MILLIGRAM(S): at 04:43

## 2020-04-11 RX ADMIN — CEFTRIAXONE 2000 MILLIGRAM(S): 500 INJECTION, POWDER, FOR SOLUTION INTRAMUSCULAR; INTRAVENOUS at 21:26

## 2020-04-11 RX ADMIN — Medication 40 MILLIGRAM(S): at 17:48

## 2020-04-11 RX ADMIN — ENOXAPARIN SODIUM 40 MILLIGRAM(S): 100 INJECTION SUBCUTANEOUS at 04:42

## 2020-04-11 RX ADMIN — Medication 200 MILLIGRAM(S): at 04:42

## 2020-04-11 RX ADMIN — Medication 5 MILLIGRAM(S): at 12:18

## 2020-04-11 RX ADMIN — Medication 650 MILLIGRAM(S): at 04:42

## 2020-04-11 RX ADMIN — FAMOTIDINE 20 MILLIGRAM(S): 10 INJECTION INTRAVENOUS at 17:48

## 2020-04-11 RX ADMIN — Medication 40 MILLIGRAM(S): at 04:43

## 2020-04-11 RX ADMIN — Medication 250 MILLIGRAM(S): at 17:47

## 2020-04-11 RX ADMIN — Medication 200 MILLIGRAM(S): at 17:47

## 2020-04-11 NOTE — PROGRESS NOTE ADULT - SUBJECTIVE AND OBJECTIVE BOX
History of Present Illness: 	  69F PMH PE (provoked after knee replacement surgery 7 years ago, no longer on AC other than ASA), presents with 3 weeks of myalgias, cough, and progressive shortness of breath. She says that initially her symptoms started with sore throat and an earache which has resolved in a week. Then, she developed myalgias and a cough which are still ongoing. She says that during this time, she spoke with her PCP over the phone who recommended supportive care and did not prescribe any antibiotics She noticed that her breathing progressively worsened and she decided to seek medical attention. She denies any recent travel or sick contacts. She currently denies any chills, chest pain, abdominal pain, n/v/d/c, edema.     Interval Events: No overnight events.  Patient seen and examined at bedside.  She reports difficulty breathing this morning when attempting to get out of bed to use commode.  She denies chest pain, calf pain.      Allergies    No Known Allergies    Intolerances    ICU Vital Signs Last 24 Hrs  T(C): 35.7 (10 Apr 2020 21:56), Max: 37.1 (10 Apr 2020 18:09)  T(F): 96.3 (10 Apr 2020 21:56), Max: 98.8 (10 Apr 2020 18:09)  HR: 96 (11 Apr 2020 09:58) (60 - 96)  BP: 133/69 (11 Apr 2020 09:58) (106/63 - 140/67)  BP(mean): --  ABP: --  ABP(mean): --  RR: 20 (11 Apr 2020 09:58) (18 - 20)  SpO2: 94% (11 Apr 2020 09:58) (91% - 94%)    I&O's Detail    10 Apr 2020 07:01  -  11 Apr 2020 07:00  --------------------------------------------------------  IN:  Total IN: 0 mL    OUT:    Voided: 1150 mL  Total OUT: 1150 mL    Total NET: -1150 mL      11 Apr 2020 07:01  -  11 Apr 2020 12:35  --------------------------------------------------------  IN:  Total IN: 0 mL    OUT:    Voided: 500 mL  Total OUT: 500 mL    Total NET: -500 mL    PHYSICAL EXAM:    General: comfortable in NAD  Neurological: non focal  Cardiovascular: NSR  Respiratory: breathing comfortably at rest, no accessory muscle use  Gastrointestinal: soft NT/ND  Extremities: no edema  Vascular: warm and well perfused bilaterally    LABS:                        12.0   8.81  )-----------( 523      ( 11 Apr 2020 07:46 )             39.3   04-11    140  |  107  |  14  ----------------------------<  106<H>  4.7   |  26  |  0.62    Ca    8.9      11 Apr 2020 07:46  Phos  3.6     04-11  Mg     2.1     04-11    TPro  5.8<L>  /  Alb  2.4<L>  /  TBili  <0.2  /  DBili  x   /  AST  10  /  ALT  14  /  AlkPhos  58  04-11  D-Dimer 605->555  CRP 8.6->3.7 on 4/10  Covid + 4/10    RADIOLOGY & ADDITIONAL STUDIES (The following images were personally reviewed): Last CXR 4/9.      · Assessment/Plan:		    69F PMH PE (provoked after knee replacement surgery 7 years ago, no longer on AC other than ASA), presents with 3 weeks of myalgias, cough, and progressive SOB admitted for acute hypoxic respiratory failure in the setting of sepsis secondary to bacterial pneumona v. COVID.     Problem/Plan - 1:  ·  Problem: Severe sepsis.  Plan: Patient presenting w/fever to 101.4, tachycardia to 145, RR 42, leukocytosis, Lactate 2.5. Likely source pulmonary in the setting of respiratory distress. Differentials include viral pneumonia (?COVID in the setting of bilateral patchy opacities on xray and elevated inflammatory markers)  vs. bacterial pneumonia given findings of nodular infiltrates on CXR and COVID neg on initial swab   - s/p azithromycin/Zosyn in the ED   - continue with ceftriaxone 2g [id number].      Problem/Plan - 2:  ·  Problem: Acute hypoxemic respiratory failure.  Plan: Patient presenting hypoxic to the 60s on RA, now sating 97% on NRB, non tachypneic with associated weakness/lethargy and findings extensive bilateral patchy and reticular nodular infiltrates CXR concerning for possible COVID  - episode of desaturation when tried to get out of bed earlier today to 70% on NC, improved with proning and NRB 15L to 95%   - continue to monitor respiratory status  - COVID PCR negative x2; but COVID PCR test 3rd test + on 4/10!  - will empirically start on azithromycin/Plaquenil given clinical status  - s/p azithromycin/zosyn in the ED   - continue ceftriaxone 2g q24hrs for bacterial pneumonia coverage  - trend inflammatory markers.      Problem/Plan - 3:  ·  Problem: R/O SARS-associated coronavirus infection.  Plan: patient presenting with 3 weeks of cough, fatigue, decreased appetite and progrssive SOB. CXR showing extensive bilateral patchy and reticular nodular infiltrates. COVID neg. Possible infiltrate on CXR can be potentially superimposed bacterial pneumonia given septic presentation and initial COVID neg, however procalcitonin only mildly elevated to 0.5  - pt febrile in ED to 101.4; increasing oxygen requirements while in the ED from 60s% on RA to 94% 15L NRB  - CRP 19.76, D-dimer 749, Ferritin 307, Fibrinogen 605 lactate 2.5,   - started hydroxychloroquine and azithromycin 500mg   - s/p azithromycin/zosyn in the ED   - continue ceftriaxone 2g q24hrs for bacterial pneumonia coverage  - Continue to trend daily LFTs, ferritin, LDH, CRP, procalcitonin  - Tylenol PRN for fever, avoid NSAIDs  - Supplemental O2 via NRB 15L saturating 95%     Problem/Plan - 4:  ·  Problem: History of pulmonary embolus (PE).  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 Previously was on Coumadin, now on aspirin.   - Presenting with tachycardia to 140s, however low suspicion of PE, given no lower extremity edema and pain on inspiration   - c/w Lovenox 40 q24     Problem/Plan - 5:  ·  Problem: Prophylactic measure.  Plan: F: none  E: Replete K<4, Mg<2  N: Regular Diet.     All above d/w Dr. Fisher.

## 2020-04-12 LAB
ALBUMIN SERPL ELPH-MCNC: 2.7 G/DL — LOW (ref 3.3–5)
ALP SERPL-CCNC: 59 U/L — SIGNIFICANT CHANGE UP (ref 40–120)
ALT FLD-CCNC: 14 U/L — SIGNIFICANT CHANGE UP (ref 10–45)
ANION GAP SERPL CALC-SCNC: 11 MMOL/L — SIGNIFICANT CHANGE UP (ref 5–17)
AST SERPL-CCNC: 12 U/L — SIGNIFICANT CHANGE UP (ref 10–40)
BASOPHILS # BLD AUTO: 0.04 K/UL — SIGNIFICANT CHANGE UP (ref 0–0.2)
BASOPHILS NFR BLD AUTO: 0.4 % — SIGNIFICANT CHANGE UP (ref 0–2)
BILIRUB SERPL-MCNC: <0.2 MG/DL — SIGNIFICANT CHANGE UP (ref 0.2–1.2)
BUN SERPL-MCNC: 13 MG/DL — SIGNIFICANT CHANGE UP (ref 7–23)
CALCIUM SERPL-MCNC: 9 MG/DL — SIGNIFICANT CHANGE UP (ref 8.4–10.5)
CHLORIDE SERPL-SCNC: 101 MMOL/L — SIGNIFICANT CHANGE UP (ref 96–108)
CO2 SERPL-SCNC: 28 MMOL/L — SIGNIFICANT CHANGE UP (ref 22–31)
CREAT SERPL-MCNC: 0.6 MG/DL — SIGNIFICANT CHANGE UP (ref 0.5–1.3)
CRP SERPL-MCNC: 0.9 MG/DL — HIGH (ref 0–0.4)
CULTURE RESULTS: SIGNIFICANT CHANGE UP
D DIMER BLD IA.RAPID-MCNC: 730 NG/ML DDU — HIGH
EOSINOPHIL # BLD AUTO: 0.1 K/UL — SIGNIFICANT CHANGE UP (ref 0–0.5)
EOSINOPHIL NFR BLD AUTO: 0.9 % — SIGNIFICANT CHANGE UP (ref 0–6)
FERRITIN SERPL-MCNC: 279 NG/ML — HIGH (ref 15–150)
GLUCOSE SERPL-MCNC: 71 MG/DL — SIGNIFICANT CHANGE UP (ref 70–99)
HCT VFR BLD CALC: 41 % — SIGNIFICANT CHANGE UP (ref 34.5–45)
HGB BLD-MCNC: 12.4 G/DL — SIGNIFICANT CHANGE UP (ref 11.5–15.5)
IMM GRANULOCYTES NFR BLD AUTO: 1.9 % — HIGH (ref 0–1.5)
LYMPHOCYTES # BLD AUTO: 19.5 % — SIGNIFICANT CHANGE UP (ref 13–44)
LYMPHOCYTES # BLD AUTO: 2.21 K/UL — SIGNIFICANT CHANGE UP (ref 1–3.3)
MAGNESIUM SERPL-MCNC: 2.3 MG/DL — SIGNIFICANT CHANGE UP (ref 1.6–2.6)
MCHC RBC-ENTMCNC: 27.2 PG — SIGNIFICANT CHANGE UP (ref 27–34)
MCHC RBC-ENTMCNC: 30.2 GM/DL — LOW (ref 32–36)
MCV RBC AUTO: 89.9 FL — SIGNIFICANT CHANGE UP (ref 80–100)
MONOCYTES # BLD AUTO: 1.05 K/UL — HIGH (ref 0–0.9)
MONOCYTES NFR BLD AUTO: 9.3 % — SIGNIFICANT CHANGE UP (ref 2–14)
NEUTROPHILS # BLD AUTO: 7.72 K/UL — HIGH (ref 1.8–7.4)
NEUTROPHILS NFR BLD AUTO: 68 % — SIGNIFICANT CHANGE UP (ref 43–77)
NRBC # BLD: 0 /100 WBCS — SIGNIFICANT CHANGE UP (ref 0–0)
PHOSPHATE SERPL-MCNC: 3.3 MG/DL — SIGNIFICANT CHANGE UP (ref 2.5–4.5)
PLATELET # BLD AUTO: 602 K/UL — HIGH (ref 150–400)
POTASSIUM SERPL-MCNC: 4.4 MMOL/L — SIGNIFICANT CHANGE UP (ref 3.5–5.3)
POTASSIUM SERPL-SCNC: 4.4 MMOL/L — SIGNIFICANT CHANGE UP (ref 3.5–5.3)
PROT SERPL-MCNC: 6 G/DL — SIGNIFICANT CHANGE UP (ref 6–8.3)
RBC # BLD: 4.56 M/UL — SIGNIFICANT CHANGE UP (ref 3.8–5.2)
RBC # FLD: 14.4 % — SIGNIFICANT CHANGE UP (ref 10.3–14.5)
SODIUM SERPL-SCNC: 140 MMOL/L — SIGNIFICANT CHANGE UP (ref 135–145)
SPECIMEN SOURCE: SIGNIFICANT CHANGE UP
WBC # BLD: 11.33 K/UL — HIGH (ref 3.8–10.5)
WBC # FLD AUTO: 11.33 K/UL — HIGH (ref 3.8–10.5)

## 2020-04-12 PROCEDURE — 99233 SBSQ HOSP IP/OBS HIGH 50: CPT

## 2020-04-12 RX ORDER — SODIUM CHLORIDE 9 MG/ML
1000 INJECTION INTRAMUSCULAR; INTRAVENOUS; SUBCUTANEOUS ONCE
Refills: 0 | Status: COMPLETED | OUTPATIENT
Start: 2020-04-12 | End: 2020-04-12

## 2020-04-12 RX ADMIN — SODIUM CHLORIDE 1000 MILLILITER(S): 9 INJECTION INTRAMUSCULAR; INTRAVENOUS; SUBCUTANEOUS at 10:02

## 2020-04-12 RX ADMIN — FAMOTIDINE 20 MILLIGRAM(S): 10 INJECTION INTRAVENOUS at 05:44

## 2020-04-12 RX ADMIN — Medication 650 MILLIGRAM(S): at 17:34

## 2020-04-12 RX ADMIN — Medication 200 MILLIGRAM(S): at 15:07

## 2020-04-12 RX ADMIN — Medication 5 MILLIGRAM(S): at 10:03

## 2020-04-12 RX ADMIN — Medication 40 MILLIGRAM(S): at 17:34

## 2020-04-12 RX ADMIN — Medication 650 MILLIGRAM(S): at 23:21

## 2020-04-12 RX ADMIN — Medication 250 MILLIGRAM(S): at 05:43

## 2020-04-12 RX ADMIN — Medication 0.5 MILLIGRAM(S): at 23:09

## 2020-04-12 RX ADMIN — Medication 250 MILLIGRAM(S): at 17:33

## 2020-04-12 RX ADMIN — Medication 200 MILLIGRAM(S): at 02:24

## 2020-04-12 RX ADMIN — ENOXAPARIN SODIUM 40 MILLIGRAM(S): 100 INJECTION SUBCUTANEOUS at 05:43

## 2020-04-12 RX ADMIN — Medication 40 MILLIGRAM(S): at 05:44

## 2020-04-12 NOTE — PROGRESS NOTE ADULT - ASSESSMENT
69F PMH PE (provoked after knee replacement surgery 7 years ago, no longer on AC other than ASA), presents with 3 weeks of myalgias, cough, and progressive shortness of breath; initially swabbed COVID negative x2 but had lymphopenia, inflammatory markers and chest imaging highly suggestive of COVID; ultimately swabbed COVID-positive on 3rd swab after being treated as a presumed positive with azithro/Plaquenil/Solu-medrol; status post 1x dose toci on 4/8; now clinically improving, transitioned two days ago to nasal cannula. During hospital course was also treated for E. coli UTI with CTX x3d, completed on 4/12. Now weaned to nasal cannula O2, appropriate for stepdown to [facility] for further O2 weaning as tolerated.     Pulmonary:  #COVID+  - trend D-dimer, CRP, ferritin daily  - daily CMP and CBC with diff  - s/p Plaquenil/azithro  - s/p toci x1   - last day of Solu-medrol on 4/13 as ordered  - COVID isolation protocol  - Ativan PRN PO ordered for anxierty  - elevated D-Dimer: full AC with Lovenox while admitted    # Urinary incontinence  - continue home oxybutynin    F: s/p 1 L NS as dry on exam today 4/12  E: Replete K<4, Mg<2  N: Kosher diet po ad terrance  G: Pepcid; home Florastor    Code: FULL CODE    Dispo: tele to [facility]

## 2020-04-12 NOTE — PROGRESS NOTE ADULT - SUBJECTIVE AND OBJECTIVE BOX
TRANSFER NOTE: COVID TELE TO COVID [facility]    HOSPITAL COURSE: 69F PMH PE (provoked after knee replacement surgery 7 years ago, no longer on AC other than ASA), presents with 3 weeks of myalgias, cough, and progressive shortness of breath; initially swabbed COVID negative x2 but had lymphopenia, inflammatory markers and chest imaging highly suggestive of COVID; ultimately swabbed COVID-positive on 3rd swab after being treated as a presumed positive with azithro/Plaquenil/Solu-medrol; status post 1x dose toci on 4/8; now clinically improving, transitioned two days ago to nasal cannula. During hospital course was also treated for E. coli UTI with CTX x3d, completed on 4/12. Now weaned to nasal cannula O2, appropriate for stepdown to [facility] for further O2 weaning as tolerated.     OVERNIGHT EVENTS: CHAPIS.     SUBJECTIVE / INTERVAL HPI: Patient seen and examined at bedside. Complains of thirst, but feeling better than yesterday. Denies fever, chills, N&V, diarrhea, dysuria.    VITAL SIGNS:  Vital Signs Last 24 Hrs  T(C): 36.1 (12 Apr 2020 15:05), Max: 36.7 (11 Apr 2020 19:12)  T(F): 96.9 (12 Apr 2020 15:05), Max: 98 (11 Apr 2020 19:12)  HR: 88 (12 Apr 2020 15:05) (68 - 94)  BP: 109/70 (12 Apr 2020 15:05) (106/53 - 118/72)  BP(mean): --  RR: 20 (12 Apr 2020 15:05) (16 - 23)  SpO2: 94% (12 Apr 2020 15:05) (81% - 95%)    PHYSICAL EXAM:  General: WDWN adult in NAD, pleasant, on nasal cannula  HEENT: mucous membranes dry  Neck: supple  Cardiovascular: RRR  Respiratory: CTA B/L  Gastrointestinal: soft, NT/ND  Extremities: WWP  Neurological: AAOx3; following commands; no focal deficits  Skin: no rash  Lines/Drains/Tubes: peripheral IV access    MEDICATIONS:  MEDICATIONS  (STANDING):  azithromycin   Tablet 250 milliGRAM(s) Oral every 24 hours  enoxaparin Injectable 40 milliGRAM(s) SubCutaneous every 24 hours  famotidine    Tablet 20 milliGRAM(s) Oral two times a day  hydroxychloroquine 200 milliGRAM(s) Oral every 12 hours  hydroxychloroquine   Oral   methylPREDNISolone sodium succinate Injectable 40 milliGRAM(s) IV Push every 12 hours  oxybutynin 5 milliGRAM(s) Oral daily  saccharomyces boulardii 250 milliGRAM(s) Oral two times a day    MEDICATIONS  (PRN):  acetaminophen   Tablet .. 650 milliGRAM(s) Oral every 6 hours PRN Temp greater or equal to 38C (100.4F), Mild Pain (1 - 3)  LORazepam     Tablet 0.5 milliGRAM(s) Oral daily PRN Anxiety      ALLERGIES:  Allergies    No Known Allergies    Intolerances        LABS:                        12.4   11.33 )-----------( 602      ( 12 Apr 2020 09:18 )             41.0     04-12    140  |  101  |  13  ----------------------------<  71  4.4   |  28  |  0.60    Ca    9.0      12 Apr 2020 09:18  Phos  3.3     04-12  Mg     2.3     04-12    TPro  6.0  /  Alb  2.7<L>  /  TBili  <0.2  /  DBili  x   /  AST  12  /  ALT  14  /  AlkPhos  59  04-12        CAPILLARY BLOOD GLUCOSE      POCT Blood Glucose.: 82 mg/dL (11 Apr 2020 17:48)      RADIOLOGY & ADDITIONAL TESTS: Reviewed.

## 2020-04-13 DIAGNOSIS — R32 UNSPECIFIED URINARY INCONTINENCE: ICD-10-CM

## 2020-04-13 DIAGNOSIS — R63.8 OTHER SYMPTOMS AND SIGNS CONCERNING FOOD AND FLUID INTAKE: ICD-10-CM

## 2020-04-13 DIAGNOSIS — N39.0 URINARY TRACT INFECTION, SITE NOT SPECIFIED: ICD-10-CM

## 2020-04-13 DIAGNOSIS — U07.1 COVID-19: ICD-10-CM

## 2020-04-13 LAB
ALBUMIN SERPL ELPH-MCNC: 2.6 G/DL — LOW (ref 3.3–5)
ALP SERPL-CCNC: 58 U/L — SIGNIFICANT CHANGE UP (ref 40–120)
ALT FLD-CCNC: 25 U/L — SIGNIFICANT CHANGE UP (ref 10–45)
ANION GAP SERPL CALC-SCNC: 11 MMOL/L — SIGNIFICANT CHANGE UP (ref 5–17)
AST SERPL-CCNC: 20 U/L — SIGNIFICANT CHANGE UP (ref 10–40)
BASOPHILS # BLD AUTO: 0.06 K/UL — SIGNIFICANT CHANGE UP (ref 0–0.2)
BASOPHILS NFR BLD AUTO: 0.5 % — SIGNIFICANT CHANGE UP (ref 0–2)
BILIRUB SERPL-MCNC: <0.2 MG/DL — SIGNIFICANT CHANGE UP (ref 0.2–1.2)
BUN SERPL-MCNC: 12 MG/DL — SIGNIFICANT CHANGE UP (ref 7–23)
CALCIUM SERPL-MCNC: 8.8 MG/DL — SIGNIFICANT CHANGE UP (ref 8.4–10.5)
CHLORIDE SERPL-SCNC: 102 MMOL/L — SIGNIFICANT CHANGE UP (ref 96–108)
CO2 SERPL-SCNC: 27 MMOL/L — SIGNIFICANT CHANGE UP (ref 22–31)
CREAT SERPL-MCNC: 0.52 MG/DL — SIGNIFICANT CHANGE UP (ref 0.5–1.3)
CRP SERPL-MCNC: 0.48 MG/DL — HIGH (ref 0–0.4)
D DIMER BLD IA.RAPID-MCNC: 675 NG/ML DDU — HIGH
EOSINOPHIL # BLD AUTO: 0.08 K/UL — SIGNIFICANT CHANGE UP (ref 0–0.5)
EOSINOPHIL NFR BLD AUTO: 0.6 % — SIGNIFICANT CHANGE UP (ref 0–6)
FERRITIN SERPL-MCNC: 254 NG/ML — HIGH (ref 15–150)
GLUCOSE SERPL-MCNC: 103 MG/DL — HIGH (ref 70–99)
HCT VFR BLD CALC: 40.8 % — SIGNIFICANT CHANGE UP (ref 34.5–45)
HGB BLD-MCNC: 12.6 G/DL — SIGNIFICANT CHANGE UP (ref 11.5–15.5)
IMM GRANULOCYTES NFR BLD AUTO: 3.1 % — HIGH (ref 0–1.5)
LYMPHOCYTES # BLD AUTO: 16.9 % — SIGNIFICANT CHANGE UP (ref 13–44)
LYMPHOCYTES # BLD AUTO: 2.2 K/UL — SIGNIFICANT CHANGE UP (ref 1–3.3)
MAGNESIUM SERPL-MCNC: 2.4 MG/DL — SIGNIFICANT CHANGE UP (ref 1.6–2.6)
MCHC RBC-ENTMCNC: 27.2 PG — SIGNIFICANT CHANGE UP (ref 27–34)
MCHC RBC-ENTMCNC: 30.9 GM/DL — LOW (ref 32–36)
MCV RBC AUTO: 87.9 FL — SIGNIFICANT CHANGE UP (ref 80–100)
MONOCYTES # BLD AUTO: 0.97 K/UL — HIGH (ref 0–0.9)
MONOCYTES NFR BLD AUTO: 7.4 % — SIGNIFICANT CHANGE UP (ref 2–14)
NEUTROPHILS # BLD AUTO: 9.33 K/UL — HIGH (ref 1.8–7.4)
NEUTROPHILS NFR BLD AUTO: 71.5 % — SIGNIFICANT CHANGE UP (ref 43–77)
NRBC # BLD: 0 /100 WBCS — SIGNIFICANT CHANGE UP (ref 0–0)
PHOSPHATE SERPL-MCNC: 3.6 MG/DL — SIGNIFICANT CHANGE UP (ref 2.5–4.5)
PLATELET # BLD AUTO: 546 K/UL — HIGH (ref 150–400)
POTASSIUM SERPL-MCNC: 4.2 MMOL/L — SIGNIFICANT CHANGE UP (ref 3.5–5.3)
POTASSIUM SERPL-SCNC: 4.2 MMOL/L — SIGNIFICANT CHANGE UP (ref 3.5–5.3)
PROT SERPL-MCNC: 5.8 G/DL — LOW (ref 6–8.3)
RBC # BLD: 4.64 M/UL — SIGNIFICANT CHANGE UP (ref 3.8–5.2)
RBC # FLD: 14.6 % — HIGH (ref 10.3–14.5)
SODIUM SERPL-SCNC: 140 MMOL/L — SIGNIFICANT CHANGE UP (ref 135–145)
WBC # BLD: 13.04 K/UL — HIGH (ref 3.8–10.5)
WBC # FLD AUTO: 13.04 K/UL — HIGH (ref 3.8–10.5)

## 2020-04-13 PROCEDURE — 99232 SBSQ HOSP IP/OBS MODERATE 35: CPT | Mod: GC

## 2020-04-13 RX ADMIN — Medication 40 MILLIGRAM(S): at 04:40

## 2020-04-13 RX ADMIN — Medication 0.5 MILLIGRAM(S): at 22:14

## 2020-04-13 RX ADMIN — Medication 250 MILLIGRAM(S): at 04:41

## 2020-04-13 RX ADMIN — Medication 5 MILLIGRAM(S): at 12:13

## 2020-04-13 RX ADMIN — Medication 250 MILLIGRAM(S): at 17:33

## 2020-04-13 RX ADMIN — ENOXAPARIN SODIUM 40 MILLIGRAM(S): 100 INJECTION SUBCUTANEOUS at 04:42

## 2020-04-13 NOTE — PROGRESS NOTE ADULT - PROBLEM SELECTOR PLAN 4
F none, tolerating Po  E replete prn  N kosher diet   GI ppx home saccharomyces boulardii     Dispo [facility]   FULL CODE

## 2020-04-13 NOTE — PROGRESS NOTE ADULT - ASSESSMENT
69F PMH PE (provoked after knee replacement surgery 7 years ago, no longer on AC other than ASA), presents with 3 weeks of myalgias, cough, and progressive shortness of breath; initially swabbed COVID negative x2 but had lymphopenia, inflammatory markers and chest imaging highly suggestive of COVID; ultimately swabbed COVID-positive on 3rd swab after being treated as a presumed positive with azithro/Plaquenil/Solu-medrol; status post 1x dose toci on 4/8; now clinically improving, transitioned two days ago to nasal cannula. During hospital course was also treated for E. coli UTI with CTX x3d, completed on 4/12. Now weaned to nasal cannula O2, appropriate for stepdown to [facility] for further O2 weaning as tolerated.     Pulmonary:  #COVID+  - trend D-dimer, CRP, ferritin daily  - daily CMP and CBC with diff  - s/p Plaquenil/azithro  - s/p toci x1   - last day of Solu-medrol on 4/13 as ordered  - COVID isolation protocol  - Ativan PRN PO ordered for anxierty  - elevated D-Dimer: full AC with Lovenox while admitted    # Urinary incontinence  - continue home oxybutynin    F: s/p 1 L NS as dry on exam today 4/12  E: Replete K<4, Mg<2  N: Kosher diet po ad terrance  G: Pepcid; home Florastor    Code: FULL CODE    Dispo: tele to [facility] 68yo F PMH PE (provoked s/p knee replacement surgery 2012, no longer on AC other than ASA), presenting with 3 weeks of myalgias, cough, and progressive SOB; initially swabbed COVID negative x2 but had lymphopenia, inflammatory markers and chest imaging highly suggestive of COVID; ultimately swabbed COVID-positive on 3rd swab after being treated as a presumed positive with azithro/Plaquenil/Solu-medrol; status post 1x dose toci on 4/8. S/p full-course CTX for symptomatic UTI. Stepped down 4/13 from COVID tele to [facility].     Pulmonary:  #COVID+  - trend D-dimer, CRP, ferritin daily  - daily CMP and CBC with diff  - s/p Plaquenil/azithro  - s/p toci x1   - last day of Solu-medrol on 4/13 as ordered  - COVID isolation protocol  - Ativan PRN PO ordered for anxierty  - elevated D-Dimer: full AC with Lovenox while admitted    # Urinary incontinence  - continue home oxybutynin    F: s/p 1 L NS as dry on exam today 4/12  E: Replete K<4, Mg<2  N: Kosher diet po ad terrance  G: Pepcid; home Florastor    Code: FULL CODE    Dispo: tele to [facility]

## 2020-04-13 NOTE — PROGRESS NOTE ADULT - PROBLEM SELECTOR PLAN 1
- daily covid labs: cbc w/ diff, cmp, mag, phos, d-dimer, ferritin, CRP  - s/p 5-day course plaquenil & azithro (4/8-4/12) & solumedrol IV (4/9-4/13)  - s/p toci x 1 4/8 prior to stepdown  - COVID isolation protocol  - Ativan PRN PO ordered for anxiety  - d-dimer, CRP, ferritin downtrending

## 2020-04-13 NOTE — PROGRESS NOTE ADULT - SUBJECTIVE AND OBJECTIVE BOX
OVERNIGHT EVENTS:    SUBJECTIVE / INTERVAL HPI: Patient seen and examined at bedside.     VITAL SIGNS:  Vital Signs Last 24 Hrs  T(C): 36.3 (13 Apr 2020 03:40), Max: 36.7 (12 Apr 2020 17:27)  T(F): 97.3 (13 Apr 2020 03:40), Max: 98.1 (12 Apr 2020 17:27)  HR: 59 (13 Apr 2020 03:40) (59 - 91)  BP: 107/66 (13 Apr 2020 03:40) (107/66 - 125/80)  BP(mean): --  RR: 15 (13 Apr 2020 03:40) (15 - 30)  SpO2: 94% (13 Apr 2020 03:40) (94% - 96%)    PHYSICAL EXAM:    General: WDWN  HEENT: NC/AT; PERRL, anicteric sclera; MMM  Neck: supple  Cardiovascular: +S1/S2; RRR  Respiratory: CTA B/L; no W/R/R  Gastrointestinal: soft, NT/ND; +BSx4  Extremities: WWP; no edema, clubbing or cyanosis  Vascular: 2+ radial, DP/PT pulses B/L  Neurological: AAOx3; no focal deficits    MEDICATIONS:  MEDICATIONS  (STANDING):  enoxaparin Injectable 40 milliGRAM(s) SubCutaneous every 24 hours  oxybutynin 5 milliGRAM(s) Oral daily  saccharomyces boulardii 250 milliGRAM(s) Oral two times a day    MEDICATIONS  (PRN):  acetaminophen   Tablet .. 650 milliGRAM(s) Oral every 6 hours PRN Temp greater or equal to 38C (100.4F), Mild Pain (1 - 3)  LORazepam     Tablet 0.5 milliGRAM(s) Oral daily PRN Anxiety      ALLERGIES:  Allergies    No Known Allergies    Intolerances        LABS:                        12.6   13.04 )-----------( 546      ( 13 Apr 2020 06:02 )             40.8     04-13    140  |  102  |  12  ----------------------------<  103<H>  4.2   |  27  |  0.52    Ca    8.8      13 Apr 2020 06:02  Phos  3.6     04-13  Mg     2.4     04-13    TPro  5.8<L>  /  Alb  2.6<L>  /  TBili  <0.2  /  DBili  x   /  AST  20  /  ALT  25  /  AlkPhos  58  04-13        CAPILLARY BLOOD GLUCOSE      POCT Blood Glucose.: 82 mg/dL (11 Apr 2020 17:48)      RADIOLOGY & ADDITIONAL TESTS: Reviewed. OVERNIGHT EVENTS: CHAPIS    SUBJECTIVE / INTERVAL HPI: Patient seen and examined at bedside. Sleeping comfortably on initial exam; NC out of place. NC replaced, SpO2 86-88% immediately thereafter, improved to 94% proned on 6L. Per nurse by subsequent page, patient noted to be destting again to 89% while sitting to eat breakfast; placed on NRB 15L w/ improvement to 97%. Asked to trial patient on 8L venturi mask. No new complaints; persistent dry cough noted on exam after minimal exertion. Continuing to deny F/C, GI sxs; is with good appetite.    VITAL SIGNS:  Vital Signs Last 24 Hrs  T(C): 36.3 (13 Apr 2020 03:40), Max: 36.7 (12 Apr 2020 17:27)  T(F): 97.3 (13 Apr 2020 03:40), Max: 98.1 (12 Apr 2020 17:27)  HR: 59 (13 Apr 2020 03:40) (59 - 91)  BP: 107/66 (13 Apr 2020 03:40) (107/66 - 125/80)  BP(mean): --  RR: 15 (13 Apr 2020 03:40) (15 - 30)  SpO2: 94% (13 Apr 2020 03:40) (94% - 96%)    PHYSICAL EXAM:    General: WDWN elderly female sleeping comfortably on initial exam, on 6L NC  HEENT: NC/AT; PERRL, anicteric sclera; MMM  Cardiovascular: +S1/S2; RRR  Respiratory: breathing comfortably on 6L NC, noted to have increased work of breathing while speaking and persistent dry cough w/ shallow breathing immediately after transferring back from commode to bed  Gastrointestinal: soft, NT/ND  Extremities: WWP; no edema, clubbing or cyanosis  Neurological: AAOx3; no focal deficits    MEDICATIONS:  MEDICATIONS  (STANDING):  enoxaparin Injectable 40 milliGRAM(s) SubCutaneous every 24 hours  oxybutynin 5 milliGRAM(s) Oral daily  saccharomyces boulardii 250 milliGRAM(s) Oral two times a day    MEDICATIONS  (PRN):  acetaminophen   Tablet .. 650 milliGRAM(s) Oral every 6 hours PRN Temp greater or equal to 38C (100.4F), Mild Pain (1 - 3)  LORazepam     Tablet 0.5 milliGRAM(s) Oral daily PRN Anxiety      ALLERGIES:  Allergies    No Known Allergies    Intolerances        LABS:                        12.6   13.04 )-----------( 546      ( 13 Apr 2020 06:02 )             40.8     04-13    140  |  102  |  12  ----------------------------<  103<H>  4.2   |  27  |  0.52    Ca    8.8      13 Apr 2020 06:02  Phos  3.6     04-13  Mg     2.4     04-13    TPro  5.8<L>  /  Alb  2.6<L>  /  TBili  <0.2  /  DBili  x   /  AST  20  /  ALT  25  /  AlkPhos  58  04-13        CAPILLARY BLOOD GLUCOSE      POCT Blood Glucose.: 82 mg/dL (11 Apr 2020 17:48)      RADIOLOGY & ADDITIONAL TESTS: Reviewed.

## 2020-04-14 LAB
ALBUMIN SERPL ELPH-MCNC: 2.8 G/DL — LOW (ref 3.3–5)
ALP SERPL-CCNC: 61 U/L — SIGNIFICANT CHANGE UP (ref 40–120)
ALT FLD-CCNC: 26 U/L — SIGNIFICANT CHANGE UP (ref 10–45)
ANION GAP SERPL CALC-SCNC: 13 MMOL/L — SIGNIFICANT CHANGE UP (ref 5–17)
APPEARANCE UR: ABNORMAL
AST SERPL-CCNC: 17 U/L — SIGNIFICANT CHANGE UP (ref 10–40)
BACTERIA # UR AUTO: PRESENT /HPF
BASOPHILS # BLD AUTO: 0.13 K/UL — SIGNIFICANT CHANGE UP (ref 0–0.2)
BASOPHILS NFR BLD AUTO: 1.3 % — SIGNIFICANT CHANGE UP (ref 0–2)
BILIRUB SERPL-MCNC: 0.2 MG/DL — SIGNIFICANT CHANGE UP (ref 0.2–1.2)
BILIRUB UR-MCNC: NEGATIVE — SIGNIFICANT CHANGE UP
BUN SERPL-MCNC: 14 MG/DL — SIGNIFICANT CHANGE UP (ref 7–23)
CALCIUM SERPL-MCNC: 9 MG/DL — SIGNIFICANT CHANGE UP (ref 8.4–10.5)
CHLORIDE SERPL-SCNC: 99 MMOL/L — SIGNIFICANT CHANGE UP (ref 96–108)
CO2 SERPL-SCNC: 27 MMOL/L — SIGNIFICANT CHANGE UP (ref 22–31)
COLOR SPEC: YELLOW — SIGNIFICANT CHANGE UP
CREAT SERPL-MCNC: 0.67 MG/DL — SIGNIFICANT CHANGE UP (ref 0.5–1.3)
CRP SERPL-MCNC: 0.22 MG/DL — SIGNIFICANT CHANGE UP (ref 0–0.4)
D DIMER BLD IA.RAPID-MCNC: 524 NG/ML DDU — HIGH
DIFF PNL FLD: NEGATIVE — SIGNIFICANT CHANGE UP
EOSINOPHIL # BLD AUTO: 0.28 K/UL — SIGNIFICANT CHANGE UP (ref 0–0.5)
EOSINOPHIL NFR BLD AUTO: 2.7 % — SIGNIFICANT CHANGE UP (ref 0–6)
EPI CELLS # UR: SIGNIFICANT CHANGE UP /HPF (ref 0–5)
FERRITIN SERPL-MCNC: 256 NG/ML — HIGH (ref 15–150)
GLUCOSE SERPL-MCNC: 91 MG/DL — SIGNIFICANT CHANGE UP (ref 70–99)
GLUCOSE UR QL: NEGATIVE — SIGNIFICANT CHANGE UP
HCT VFR BLD CALC: 46.6 % — HIGH (ref 34.5–45)
HGB BLD-MCNC: 14.1 G/DL — SIGNIFICANT CHANGE UP (ref 11.5–15.5)
IMM GRANULOCYTES NFR BLD AUTO: 6.9 % — HIGH (ref 0–1.5)
KETONES UR-MCNC: ABNORMAL MG/DL
LEUKOCYTE ESTERASE UR-ACNC: NEGATIVE — SIGNIFICANT CHANGE UP
LYMPHOCYTES # BLD AUTO: 2.61 K/UL — SIGNIFICANT CHANGE UP (ref 1–3.3)
LYMPHOCYTES # BLD AUTO: 25.5 % — SIGNIFICANT CHANGE UP (ref 13–44)
MAGNESIUM SERPL-MCNC: 2.2 MG/DL — SIGNIFICANT CHANGE UP (ref 1.6–2.6)
MCHC RBC-ENTMCNC: 26.9 PG — LOW (ref 27–34)
MCHC RBC-ENTMCNC: 30.3 GM/DL — LOW (ref 32–36)
MCV RBC AUTO: 88.9 FL — SIGNIFICANT CHANGE UP (ref 80–100)
MONOCYTES # BLD AUTO: 0.86 K/UL — SIGNIFICANT CHANGE UP (ref 0–0.9)
MONOCYTES NFR BLD AUTO: 8.4 % — SIGNIFICANT CHANGE UP (ref 2–14)
NEUTROPHILS # BLD AUTO: 5.65 K/UL — SIGNIFICANT CHANGE UP (ref 1.8–7.4)
NEUTROPHILS NFR BLD AUTO: 55.2 % — SIGNIFICANT CHANGE UP (ref 43–77)
NITRITE UR-MCNC: NEGATIVE — SIGNIFICANT CHANGE UP
NRBC # BLD: 0 /100 WBCS — SIGNIFICANT CHANGE UP (ref 0–0)
PH UR: 6 — SIGNIFICANT CHANGE UP (ref 5–8)
PHOSPHATE SERPL-MCNC: 4.1 MG/DL — SIGNIFICANT CHANGE UP (ref 2.5–4.5)
PLATELET # BLD AUTO: 549 K/UL — HIGH (ref 150–400)
POTASSIUM SERPL-MCNC: 4.1 MMOL/L — SIGNIFICANT CHANGE UP (ref 3.5–5.3)
POTASSIUM SERPL-SCNC: 4.1 MMOL/L — SIGNIFICANT CHANGE UP (ref 3.5–5.3)
PROT SERPL-MCNC: 5.9 G/DL — LOW (ref 6–8.3)
PROT UR-MCNC: 30 MG/DL
RBC # BLD: 5.24 M/UL — HIGH (ref 3.8–5.2)
RBC # FLD: 14.6 % — HIGH (ref 10.3–14.5)
SODIUM SERPL-SCNC: 139 MMOL/L — SIGNIFICANT CHANGE UP (ref 135–145)
SP GR SPEC: >=1.03 — SIGNIFICANT CHANGE UP (ref 1–1.03)
UROBILINOGEN FLD QL: 1 E.U./DL — SIGNIFICANT CHANGE UP
WBC # BLD: 10.24 K/UL — SIGNIFICANT CHANGE UP (ref 3.8–10.5)
WBC # FLD AUTO: 10.24 K/UL — SIGNIFICANT CHANGE UP (ref 3.8–10.5)
WBC UR QL: < 5 /HPF — SIGNIFICANT CHANGE UP

## 2020-04-14 PROCEDURE — 99232 SBSQ HOSP IP/OBS MODERATE 35: CPT | Mod: GC

## 2020-04-14 RX ORDER — SIMETHICONE 80 MG/1
80 TABLET, CHEWABLE ORAL ONCE
Refills: 0 | Status: COMPLETED | OUTPATIENT
Start: 2020-04-14 | End: 2020-04-14

## 2020-04-14 RX ADMIN — ENOXAPARIN SODIUM 40 MILLIGRAM(S): 100 INJECTION SUBCUTANEOUS at 06:09

## 2020-04-14 RX ADMIN — Medication 250 MILLIGRAM(S): at 06:09

## 2020-04-14 RX ADMIN — Medication 650 MILLIGRAM(S): at 10:21

## 2020-04-14 RX ADMIN — Medication 5 MILLIGRAM(S): at 11:18

## 2020-04-14 RX ADMIN — Medication 0.5 MILLIGRAM(S): at 21:14

## 2020-04-14 RX ADMIN — SIMETHICONE 80 MILLIGRAM(S): 80 TABLET, CHEWABLE ORAL at 00:32

## 2020-04-14 RX ADMIN — Medication 250 MILLIGRAM(S): at 17:52

## 2020-04-14 NOTE — PROGRESS NOTE ADULT - ATTENDING COMMENTS
I have personally seen, examined, and participated in the care of this patient. I have reviewed all pertinent clinical information, including history, physical exam, plan and the NP's note and agree with the above.    -Ill appearing and fatigued, SOB while talking  -O2 sats low 90s on 15 L NRB  -CXR - with bilateral infiltrates (L>R)  -Cover for CAP with Ceftriax  -Repeat COVID swab:  Azithro/plaquenil/solumedrol/toci x 1  -DVT, GI ppx  -IVF for maintenance  -Cont to monitor inflam biomarkers  -CXR in am
I have personally seen, examined, and participated in the care of this patient. I have reviewed all pertinent clinical information, including history, physical exam, plan and the NP's note and agree with the above.    -Still SOB this am with mildly increased inflam biomarkers  -Unable to prone for extended period of time due to discomfort  -Covid neg x 2, sputum cx sent for definitive diagnosis  -Cont zithromax/plaquenil/IV solumedrol/ s/p toci x 1. On Ceftriax to cover poss CAP  -DVT, GI ppx  -By late afternoon, pt with improved oxygenation, satting 90s on 10 L NC   -FU urine culture
I have personally seen, examined, and participated in the care of this patient. I have reviewed all pertinent clinical information, including history, physical exam, plan and the NP's note and agree with the above.    -With sig clinical improvement with improved with SOB  -Weaned off NRB, satting 90s on 8 L NC  -Cont Plaq/zithro/steroids  -On Ceftriax to cover CAP, + Ur Cx  -Appears dehydrated on exam, IVF today  -QTc within normal  -DVT, GI ppx  -Sputum insuff quantity, 3rd nasal swab sent today for confirmation  -Likely for stepdown 4/11
I have personally seen, examined, and participated in the care of this patient. I have reviewed all pertinent clinical information, including history, physical exam, plan and the PAs note and agree with the above.    -Severe SOB with minimal exertion today, with desat in 70s  -O2 improved with NRB 15 L  -Can attempt to wean off NRB back to NC later today  -Cont Ceftriax/zithro/plaq/solumedrol  -Cont DVT/GI ppx
I have personally seen, examined, and participated in the care of this patient. I have reviewed all pertinent clinical information, including history, physical exam, plan and the resident's note and agree with the above.
Agree with plan above; exam limited to one person on a team during covid.
D/w team and exam per residents during covid.

## 2020-04-14 NOTE — PROGRESS NOTE ADULT - PROBLEM SELECTOR PLAN 4
F none, tolerating Po  E replete prn  N kosher diet   GI ppx home saccharomyces boulardii     Dispo [facility]   FULL CODE F none, tolerating PO  E replete prn  N kosher diet     DVT ppx lovenox  GI ppx home saccharomyces boulardii   Dispo [facility]   FULL CODE

## 2020-04-14 NOTE — PROGRESS NOTE ADULT - ASSESSMENT
70yo F PMH PE (provoked s/p knee replacement surgery 2012, no longer on AC other than ASA), presenting with 3 weeks of myalgias, cough, and progressive SOB; initially swabbed COVID negative x2 but had lymphopenia, inflammatory markers and chest imaging highly suggestive of COVID; ultimately swabbed COVID-positive on 3rd swab after being treated as a presumed positive with azithro/Plaquenil/Solu-medrol; status post 1x dose toci on 4/8. S/p full-course CTX for symptomatic UTI. Stepped down 4/13 from COVID tele to [facility].     Pulmonary:  #COVID+  - trend D-dimer, CRP, ferritin daily  - daily CMP and CBC with diff  - s/p Plaquenil/azithro  - s/p toci x1   - last day of Solu-medrol on 4/13 as ordered  - COVID isolation protocol  - Ativan PRN PO ordered for anxierty  - elevated D-Dimer: full AC with Lovenox while admitted    # Urinary incontinence  - continue home oxybutynin    F: s/p 1 L NS as dry on exam today 4/12  E: Replete K<4, Mg<2  N: Kosher diet po ad terrance  G: Pepcid; home Florastor    Code: FULL CODE    Dispo: tele to [facility] 70yo F w/ PMH of PE (provoked s/p knee replacement surgery 2012, on ASA), presenting w/ myalgias, cough, and progressive SOB x 3 wks; admitted to COVID tele as COVID r/o, requiring NRB. COVID regimen started empirically on admission for consistent labs and imaging, despite 2 negative swabs. Ultimately tested positive 4/10. Hospital course c/b UTI, s/p 5-day course CTX. Stepped down 4/13 & weaned to venturi mask 4/13. Dispo home pending wean from O2.    - s/p 5-day course plaquenil (4/8-4/12), azithromycin (4/8-4/12) & solumedrol (4/9-4/13)  - s/p toci 4/8

## 2020-04-14 NOTE — PROGRESS NOTE ADULT - PROBLEM SELECTOR PLAN 1
- daily covid labs: cbc w/ diff, cmp, mag, phos, d-dimer, ferritin, CRP  - s/p 5-day course plaquenil & azithro (4/8-4/12) & solumedrol IV (4/9-4/13)  - s/p toci x 1 4/8 prior to stepdown  - COVID isolation protocol  - Ativan PRN PO ordered for anxiety  - d-dimer, CRP, ferritin downtrending - daily covid labs: cbc w/ diff, cmp, mag, phos, d-dimer, ferritin, CRP  - s/p 5-day course plaquenil & azithro (4/8-4/12) & solumedrol IV (4/9-4/13)  - s/p toci x 1 4/8 prior to stepdown  - COVID isolation protocol  - c/w ativan PRN PO ordered for anxiety surrounding diagnosis  - d-dimer downtrending, ferritin stable, CRP wnl  - tylenol 650mg q6hrs prn for fever  - DVT ppx lovenox 40mg BID

## 2020-04-14 NOTE — PROGRESS NOTE ADULT - SUBJECTIVE AND OBJECTIVE BOX
OVERNIGHT EVENTS:    SUBJECTIVE / INTERVAL HPI: Patient seen and examined at bedside.     VITAL SIGNS:  Vital Signs Last 24 Hrs  T(C): 35.9 (14 Apr 2020 06:06), Max: 36.3 (13 Apr 2020 20:36)  T(F): 96.7 (14 Apr 2020 06:06), Max: 97.4 (13 Apr 2020 20:36)  HR: 71 (14 Apr 2020 06:06) (71 - 88)  BP: 100/70 (14 Apr 2020 06:06) (100/70 - 110/75)  BP(mean): --  RR: 19 (14 Apr 2020 06:06) (18 - 20)  SpO2: 93% (14 Apr 2020 06:06) (89% - 97%)    PHYSICAL EXAM:    General: WDWN  HEENT: NC/AT; PERRL, anicteric sclera; MMM  Neck: supple  Cardiovascular: +S1/S2; RRR  Respiratory: CTA B/L; no W/R/R  Gastrointestinal: soft, NT/ND; +BSx4  Extremities: WWP; no edema, clubbing or cyanosis  Vascular: 2+ radial, DP/PT pulses B/L  Neurological: AAOx3; no focal deficits    MEDICATIONS:  MEDICATIONS  (STANDING):  enoxaparin Injectable 40 milliGRAM(s) SubCutaneous every 24 hours  oxybutynin 5 milliGRAM(s) Oral daily  saccharomyces boulardii 250 milliGRAM(s) Oral two times a day    MEDICATIONS  (PRN):  acetaminophen   Tablet .. 650 milliGRAM(s) Oral every 6 hours PRN Temp greater or equal to 38C (100.4F), Mild Pain (1 - 3)  LORazepam     Tablet 0.5 milliGRAM(s) Oral daily PRN Anxiety      ALLERGIES:  Allergies    No Known Allergies    Intolerances        LABS:                        14.1   10.24 )-----------( 549      ( 14 Apr 2020 07:17 )             46.6     04-13    140  |  102  |  12  ----------------------------<  103<H>  4.2   |  27  |  0.52    Ca    8.8      13 Apr 2020 06:02  Phos  3.6     04-13  Mg     2.4     04-13    TPro  5.8<L>  /  Alb  2.6<L>  /  TBili  <0.2  /  DBili  x   /  AST  20  /  ALT  25  /  AlkPhos  58  04-13        CAPILLARY BLOOD GLUCOSE          RADIOLOGY & ADDITIONAL TESTS: Reviewed. OVERNIGHT EVENTS: patient complaining of chest/epigastric discomfort overnight w/o associated N/V/diaphoresis, non-reproducible, STAT EKG unchanged from prior - resolved s/p 80mg simethicone     SUBJECTIVE / INTERVAL HPI: Patient seen and examined at bedside. Resting comfortably in bed, venturi mask in place - set to 8L O2. SpO2 bedside 93%. Patient denying new complaints. Dry cough improving, as is subjective SOB. Denying CP, palpitations, GI complaints. Patient eager to be weaned to NC. D/c home pending wean from O2 & walking sat; family requesting daily updates by phone (, Buck - [phone]).    VITAL SIGNS:  Vital Signs Last 24 Hrs  T(C): 35.9 (14 Apr 2020 06:06), Max: 36.3 (13 Apr 2020 20:36)  T(F): 96.7 (14 Apr 2020 06:06), Max: 97.4 (13 Apr 2020 20:36)  HR: 71 (14 Apr 2020 06:06) (71 - 88)  BP: 100/70 (14 Apr 2020 06:06) (100/70 - 110/75)  BP(mean): --  RR: 19 (14 Apr 2020 06:06) (18 - 20)  SpO2: 93% (14 Apr 2020 06:06) (89% - 97%)    PHYSICAL EXAM:    General: WDWN woman resting comfortably in bed, venturi mask in place, speaking on the phone  HEENT: NC/AT; PERRL, anicteric sclera; MMM  Cardiovascular: +S1/S2; RRR  Respiratory: breathing comfortably on venturi mask at 8L O2, speaking comfortably in full sentences and w/o dry cough noted on exam; no evidence of increased work of breathing  Gastrointestinal: soft, NT/ND  Extremities: WWP; no edema, clubbing or cyanosis  Neurological: AAOx3; no focal deficits    MEDICATIONS:  MEDICATIONS  (STANDING):  enoxaparin Injectable 40 milliGRAM(s) SubCutaneous every 24 hours  oxybutynin 5 milliGRAM(s) Oral daily  saccharomyces boulardii 250 milliGRAM(s) Oral two times a day    MEDICATIONS  (PRN):  acetaminophen   Tablet .. 650 milliGRAM(s) Oral every 6 hours PRN Temp greater or equal to 38C (100.4F), Mild Pain (1 - 3)  LORazepam     Tablet 0.5 milliGRAM(s) Oral daily PRN Anxiety      ALLERGIES:  Allergies    No Known Allergies    Intolerances        LABS:                        14.1   10.24 )-----------( 549      ( 14 Apr 2020 07:17 )             46.6     04-13    140  |  102  |  12  ----------------------------<  103<H>  4.2   |  27  |  0.52    Ca    8.8      13 Apr 2020 06:02  Phos  3.6     04-13  Mg     2.4     04-13    TPro  5.8<L>  /  Alb  2.6<L>  /  TBili  <0.2  /  DBili  x   /  AST  20  /  ALT  25  /  AlkPhos  58  04-13        CAPILLARY BLOOD GLUCOSE          RADIOLOGY & ADDITIONAL TESTS: Reviewed.

## 2020-04-15 ENCOUNTER — TRANSCRIPTION ENCOUNTER (OUTPATIENT)
Age: 70
End: 2020-04-15

## 2020-04-15 VITALS
TEMPERATURE: 98 F | HEART RATE: 78 BPM | DIASTOLIC BLOOD PRESSURE: 63 MMHG | SYSTOLIC BLOOD PRESSURE: 98 MMHG | RESPIRATION RATE: 17 BRPM | OXYGEN SATURATION: 97 %

## 2020-04-15 LAB
ALBUMIN SERPL ELPH-MCNC: 2.8 G/DL — LOW (ref 3.3–5)
ALP SERPL-CCNC: 59 U/L — SIGNIFICANT CHANGE UP (ref 40–120)
ALT FLD-CCNC: 19 U/L — SIGNIFICANT CHANGE UP (ref 10–45)
ANION GAP SERPL CALC-SCNC: 10 MMOL/L — SIGNIFICANT CHANGE UP (ref 5–17)
AST SERPL-CCNC: 14 U/L — SIGNIFICANT CHANGE UP (ref 10–40)
BASOPHILS # BLD AUTO: 0.17 K/UL — SIGNIFICANT CHANGE UP (ref 0–0.2)
BASOPHILS NFR BLD AUTO: 1.4 % — SIGNIFICANT CHANGE UP (ref 0–2)
BILIRUB SERPL-MCNC: <0.2 MG/DL — SIGNIFICANT CHANGE UP (ref 0.2–1.2)
BUN SERPL-MCNC: 14 MG/DL — SIGNIFICANT CHANGE UP (ref 7–23)
CALCIUM SERPL-MCNC: 8.8 MG/DL — SIGNIFICANT CHANGE UP (ref 8.4–10.5)
CHLORIDE SERPL-SCNC: 101 MMOL/L — SIGNIFICANT CHANGE UP (ref 96–108)
CO2 SERPL-SCNC: 26 MMOL/L — SIGNIFICANT CHANGE UP (ref 22–31)
CREAT SERPL-MCNC: 0.66 MG/DL — SIGNIFICANT CHANGE UP (ref 0.5–1.3)
CRP SERPL-MCNC: 0.16 MG/DL — SIGNIFICANT CHANGE UP (ref 0–0.4)
D DIMER BLD IA.RAPID-MCNC: 549 NG/ML DDU — HIGH
EOSINOPHIL # BLD AUTO: 0.5 K/UL — SIGNIFICANT CHANGE UP (ref 0–0.5)
EOSINOPHIL NFR BLD AUTO: 4.1 % — SIGNIFICANT CHANGE UP (ref 0–6)
FERRITIN SERPL-MCNC: 217 NG/ML — HIGH (ref 15–150)
GLUCOSE SERPL-MCNC: 99 MG/DL — SIGNIFICANT CHANGE UP (ref 70–99)
HCT VFR BLD CALC: 45.3 % — HIGH (ref 34.5–45)
HGB BLD-MCNC: 13.7 G/DL — SIGNIFICANT CHANGE UP (ref 11.5–15.5)
IMM GRANULOCYTES NFR BLD AUTO: 7.3 % — HIGH (ref 0–1.5)
LYMPHOCYTES # BLD AUTO: 2.47 K/UL — SIGNIFICANT CHANGE UP (ref 1–3.3)
LYMPHOCYTES # BLD AUTO: 20.1 % — SIGNIFICANT CHANGE UP (ref 13–44)
MAGNESIUM SERPL-MCNC: 2.2 MG/DL — SIGNIFICANT CHANGE UP (ref 1.6–2.6)
MCHC RBC-ENTMCNC: 26.9 PG — LOW (ref 27–34)
MCHC RBC-ENTMCNC: 30.2 GM/DL — LOW (ref 32–36)
MCV RBC AUTO: 89 FL — SIGNIFICANT CHANGE UP (ref 80–100)
MONOCYTES # BLD AUTO: 0.97 K/UL — HIGH (ref 0–0.9)
MONOCYTES NFR BLD AUTO: 7.9 % — SIGNIFICANT CHANGE UP (ref 2–14)
NEUTROPHILS # BLD AUTO: 7.27 K/UL — SIGNIFICANT CHANGE UP (ref 1.8–7.4)
NEUTROPHILS NFR BLD AUTO: 59.2 % — SIGNIFICANT CHANGE UP (ref 43–77)
NRBC # BLD: 0 /100 WBCS — SIGNIFICANT CHANGE UP (ref 0–0)
PHOSPHATE SERPL-MCNC: 4.3 MG/DL — SIGNIFICANT CHANGE UP (ref 2.5–4.5)
PLATELET # BLD AUTO: 497 K/UL — HIGH (ref 150–400)
POTASSIUM SERPL-MCNC: 4.6 MMOL/L — SIGNIFICANT CHANGE UP (ref 3.5–5.3)
POTASSIUM SERPL-SCNC: 4.6 MMOL/L — SIGNIFICANT CHANGE UP (ref 3.5–5.3)
PROT SERPL-MCNC: 5.7 G/DL — LOW (ref 6–8.3)
RBC # BLD: 5.09 M/UL — SIGNIFICANT CHANGE UP (ref 3.8–5.2)
RBC # FLD: 14.9 % — HIGH (ref 10.3–14.5)
SODIUM SERPL-SCNC: 137 MMOL/L — SIGNIFICANT CHANGE UP (ref 135–145)
WBC # BLD: 12.28 K/UL — HIGH (ref 3.8–10.5)
WBC # FLD AUTO: 12.28 K/UL — HIGH (ref 3.8–10.5)

## 2020-04-15 PROCEDURE — 99239 HOSP IP/OBS DSCHRG MGMT >30: CPT | Mod: GC

## 2020-04-15 PROCEDURE — 82803 BLOOD GASES ANY COMBINATION: CPT

## 2020-04-15 PROCEDURE — 87486 CHLMYD PNEUM DNA AMP PROBE: CPT

## 2020-04-15 PROCEDURE — 85025 COMPLETE CBC W/AUTO DIFF WBC: CPT

## 2020-04-15 PROCEDURE — 82728 ASSAY OF FERRITIN: CPT

## 2020-04-15 PROCEDURE — 82550 ASSAY OF CK (CPK): CPT

## 2020-04-15 PROCEDURE — 87581 M.PNEUMON DNA AMP PROBE: CPT

## 2020-04-15 PROCEDURE — 83735 ASSAY OF MAGNESIUM: CPT

## 2020-04-15 PROCEDURE — 80053 COMPREHEN METABOLIC PANEL: CPT

## 2020-04-15 PROCEDURE — 84100 ASSAY OF PHOSPHORUS: CPT

## 2020-04-15 PROCEDURE — 87389 HIV-1 AG W/HIV-1&-2 AB AG IA: CPT

## 2020-04-15 PROCEDURE — 87070 CULTURE OTHR SPECIMN AEROBIC: CPT

## 2020-04-15 PROCEDURE — 86803 HEPATITIS C AB TEST: CPT

## 2020-04-15 PROCEDURE — 87798 DETECT AGENT NOS DNA AMP: CPT

## 2020-04-15 PROCEDURE — 81001 URINALYSIS AUTO W/SCOPE: CPT

## 2020-04-15 PROCEDURE — 87186 SC STD MICRODIL/AGAR DIL: CPT

## 2020-04-15 PROCEDURE — 71045 X-RAY EXAM CHEST 1 VIEW: CPT

## 2020-04-15 PROCEDURE — 84484 ASSAY OF TROPONIN QUANT: CPT

## 2020-04-15 PROCEDURE — 83615 LACTATE (LD) (LDH) ENZYME: CPT

## 2020-04-15 PROCEDURE — 93005 ELECTROCARDIOGRAM TRACING: CPT

## 2020-04-15 PROCEDURE — 85379 FIBRIN DEGRADATION QUANT: CPT

## 2020-04-15 PROCEDURE — 82962 GLUCOSE BLOOD TEST: CPT

## 2020-04-15 PROCEDURE — 85610 PROTHROMBIN TIME: CPT

## 2020-04-15 PROCEDURE — 87633 RESP VIRUS 12-25 TARGETS: CPT

## 2020-04-15 PROCEDURE — 85652 RBC SED RATE AUTOMATED: CPT

## 2020-04-15 PROCEDURE — 84145 PROCALCITONIN (PCT): CPT

## 2020-04-15 PROCEDURE — 86140 C-REACTIVE PROTEIN: CPT

## 2020-04-15 PROCEDURE — 96375 TX/PRO/DX INJ NEW DRUG ADDON: CPT

## 2020-04-15 PROCEDURE — 87040 BLOOD CULTURE FOR BACTERIA: CPT

## 2020-04-15 PROCEDURE — 87086 URINE CULTURE/COLONY COUNT: CPT

## 2020-04-15 PROCEDURE — 85730 THROMBOPLASTIN TIME PARTIAL: CPT

## 2020-04-15 PROCEDURE — 82553 CREATINE MB FRACTION: CPT

## 2020-04-15 PROCEDURE — 83605 ASSAY OF LACTIC ACID: CPT

## 2020-04-15 PROCEDURE — 82955 ASSAY OF G6PD ENZYME: CPT

## 2020-04-15 PROCEDURE — 36415 COLL VENOUS BLD VENIPUNCTURE: CPT

## 2020-04-15 PROCEDURE — 87635 SARS-COV-2 COVID-19 AMP PRB: CPT

## 2020-04-15 PROCEDURE — 85384 FIBRINOGEN ACTIVITY: CPT

## 2020-04-15 PROCEDURE — 99291 CRITICAL CARE FIRST HOUR: CPT | Mod: 25

## 2020-04-15 PROCEDURE — 96365 THER/PROPH/DIAG IV INF INIT: CPT

## 2020-04-15 PROCEDURE — 86480 TB TEST CELL IMMUN MEASURE: CPT

## 2020-04-15 RX ORDER — ACETAMINOPHEN 500 MG
2 TABLET ORAL
Qty: 40 | Refills: 0
Start: 2020-04-15 | End: 2020-04-19

## 2020-04-15 RX ORDER — ACETAMINOPHEN 500 MG
2 TABLET ORAL
Qty: 0 | Refills: 0 | DISCHARGE
Start: 2020-04-15

## 2020-04-15 RX ORDER — ASPIRIN/CALCIUM CARB/MAGNESIUM 324 MG
1 TABLET ORAL
Qty: 5 | Refills: 0
Start: 2020-04-15 | End: 2020-04-19

## 2020-04-15 RX ORDER — ASPIRIN/CALCIUM CARB/MAGNESIUM 324 MG
1 TABLET ORAL
Qty: 0 | Refills: 0 | DISCHARGE

## 2020-04-15 RX ORDER — SACCHAROMYCES BOULARDII 250 MG
1 POWDER IN PACKET (EA) ORAL
Qty: 0 | Refills: 0 | DISCHARGE
Start: 2020-04-15

## 2020-04-15 RX ORDER — SACCHAROMYCES BOULARDII 250 MG
1 POWDER IN PACKET (EA) ORAL
Qty: 10 | Refills: 0
Start: 2020-04-15 | End: 2020-04-19

## 2020-04-15 RX ORDER — ENOXAPARIN SODIUM 100 MG/ML
40 INJECTION SUBCUTANEOUS
Qty: 200 | Refills: 0
Start: 2020-04-15 | End: 2020-04-19

## 2020-04-15 RX ORDER — OXYBUTYNIN CHLORIDE 5 MG
0 TABLET ORAL
Qty: 0 | Refills: 0 | DISCHARGE

## 2020-04-15 RX ORDER — OXYBUTYNIN CHLORIDE 5 MG
1 TABLET ORAL
Qty: 5 | Refills: 0
Start: 2020-04-15 | End: 2020-04-19

## 2020-04-15 RX ORDER — ENOXAPARIN SODIUM 100 MG/ML
40 INJECTION SUBCUTANEOUS
Qty: 0 | Refills: 0 | DISCHARGE
Start: 2020-04-15

## 2020-04-15 RX ADMIN — ENOXAPARIN SODIUM 40 MILLIGRAM(S): 100 INJECTION SUBCUTANEOUS at 05:25

## 2020-04-15 RX ADMIN — Medication 650 MILLIGRAM(S): at 04:08

## 2020-04-15 RX ADMIN — Medication 250 MILLIGRAM(S): at 05:24

## 2020-04-15 RX ADMIN — Medication 5 MILLIGRAM(S): at 12:07

## 2020-04-15 RX ADMIN — Medication 0.5 MILLIGRAM(S): at 14:55

## 2020-04-15 NOTE — PROGRESS NOTE ADULT - PROBLEM SELECTOR PLAN 1
- daily covid labs: cbc w/ diff, cmp, mag, phos, d-dimer, ferritin, CRP  - s/p 5-day course plaquenil & azithro (4/8-4/12) & solumedrol IV (4/9-4/13)  - s/p toci x 1 4/8  - COVID isolation protocol  - c/w ativan PRN PO ordered for anxiety surrounding diagnosis  - d-dimer stable, ferritin stable, CRP wnl  - tylenol 650mg q6hrs prn for fever  - DVT ppx lovenox 40mg qd

## 2020-04-15 NOTE — DISCHARGE NOTE PROVIDER - NSDCCPCAREPLAN_GEN_ALL_CORE_FT
PRINCIPAL DISCHARGE DIAGNOSIS  Diagnosis: Hypoxia  Assessment and Plan of Treatment: You were found to have the novel new coronavirus, also known as COVID-19. This test resulted from a nasal swab that was done earlier in your admission to [facility]. Your symptoms improved dramatically during your hospital stay. Since your test result was positive, the treatment is supportive care, which means: rest, hydration, and maintaining a good healthy diet. You may take Tylenol if you experience any fevers and Robitussin for cough.   If you start experiencing extreme shortness of breath, difficulty breathing resulting in the inability to even walk, please visit an urgent care. Please maintain a 2 week (14 day) quarantine in your apartment. Wear a mask at all times should you have to be outdoors briefly - and avoid crowds, public spaces, and mass transit at all times during this quarantine. Continue to wash your hands thoroughly and frequently.

## 2020-04-15 NOTE — PROGRESS NOTE ADULT - ASSESSMENT
68yo F w/ PMH of PE (provoked s/p knee replacement surgery 2012, on ASA), presenting w/ myalgias, cough, and progressive SOB x 3 wks; admitted to COVID tele as COVID r/o, requiring NRB. COVID regimen started empirically on admission for consistent labs and imaging, despite 2 negative swabs. Ultimately tested positive 4/10. Hospital course c/b UTI, s/p 5-day course CTX. Stepped down 4/13 & weaned to venturi mask 4/13. Dispo home pending wean from O2.    - s/p 5-day course plaquenil (4/8-4/12), azithromycin (4/8-4/12) & solumedrol (4/9-4/13)  - s/p toci 4/8

## 2020-04-15 NOTE — PROGRESS NOTE ADULT - PROBLEM SELECTOR PLAN 4
F none, tolerating PO  E replete prn  N kosher diet     DVT ppx lovenox  GI ppx home saccharomyces boulardii   Dispo [facility]   FULL CODE

## 2020-04-15 NOTE — DISCHARGE NOTE PROVIDER - HOSPITAL COURSE
tbd        Patient is __ yo M/F with past medical history of _____    Presented with _____, found to have _____    Problem List/Main Diagnoses (system-based):     Inpatient treatment course:     New medications:     Labs to be followed outpatient:     Exam to be followed outpatient: 69F w/ PMH of PE (provoked s/p knee replacement surgery 2012, on ASA), presenting w/ myalgias, cough, and progressive SOB x 3 wks; admitted to COVID tele as COVID r/o, requiring NRB. COVID regimen started empirically on admission for consistent labs and imaging, despite 2 negative swabs. Ultimately tested positive 4/10. Hospital course c/b UTI, s/p 5-day course CTX. Stepped down 4/13 & weaned to venturi mask 4/13. - s/p 5-day course plaquenil (4/8-4/12), azithromycin (4/8-4/12) & solumedrol (4/9-4/13)- s/p toci 4/8 . Pt stable on nasal canula for transfer to [facility] for further weaning of O2.

## 2020-04-15 NOTE — PROGRESS NOTE ADULT - SUBJECTIVE AND OBJECTIVE BOX
OVERNIGHT EVENTS: CHAPIS     SUBJECTIVE / INTERVAL HPI: Patient seen and examined at bedside. Patient resting in bed saying she feels good. Has a mild headache this morning. Denies chest pain, sob, nausea, vomiting, abdominal pain.     VITAL SIGNS:  Vital Signs Last 24 Hrs  T(C): 36.6 (15 Apr 2020 08:33), Max: 36.6 (15 Apr 2020 08:33)  T(F): 97.8 (15 Apr 2020 08:33), Max: 97.8 (15 Apr 2020 08:33)  HR: 78 (15 Apr 2020 08:33) (71 - 90)  BP: 98/63 (15 Apr 2020 08:33) (98/63 - 106/71)  BP(mean): --  RR: 17 (15 Apr 2020 08:33) (17 - 21)  SpO2: 97% (15 Apr 2020 08:33) (94% - 98%)    PHYSICAL EXAM:    General: WDWN  HEENT: NC/AT; PERRL, anicteric sclera; MMM  Neck: supple  Cardiovascular: +S1/S2; RRR  Respiratory: On NC, CTA B/L; no W/R/R  Gastrointestinal: soft, NT/ND; +BSx4  Extremities: WWP; no edema, clubbing or cyanosis  Vascular: 2+ radial, DP/PT pulses B/L  Neurological: AAOx3; no focal deficits    MEDICATIONS:  MEDICATIONS  (STANDING):  enoxaparin Injectable 40 milliGRAM(s) SubCutaneous every 24 hours  oxybutynin 5 milliGRAM(s) Oral daily  saccharomyces boulardii 250 milliGRAM(s) Oral two times a day    MEDICATIONS  (PRN):  acetaminophen   Tablet .. 650 milliGRAM(s) Oral every 6 hours PRN Temp greater or equal to 38C (100.4F), Mild Pain (1 - 3)  LORazepam     Tablet 0.5 milliGRAM(s) Oral daily PRN Anxiety      ALLERGIES:  Allergies    No Known Allergies    Intolerances        LABS:                        13.7   12.28 )-----------( 497      ( 15 Apr 2020 06:32 )             45.3     04-15    137  |  101  |  14  ----------------------------<  99  4.6   |  26  |  0.66    Ca    8.8      15 Apr 2020 06:32  Phos  4.3     04-15  Mg     2.2     04-15    TPro  5.7<L>  /  Alb  2.8<L>  /  TBili  <0.2  /  DBili  x   /  AST  14  /  ALT  19  /  AlkPhos  59  04-15      Urinalysis Basic - ( 14 Apr 2020 19:37 )    Color: Yellow / Appearance: SL Cloudy / SG: >=1.030 / pH: x  Gluc: x / Ketone: Trace mg/dL  / Bili: Negative / Urobili: 1.0 E.U./dL   Blood: x / Protein: 30 mg/dL / Nitrite: NEGATIVE   Leuk Esterase: NEGATIVE / RBC: x / WBC < 5 /HPF   Sq Epi: x / Non Sq Epi: 0-5 /HPF / Bacteria: Present /HPF      CAPILLARY BLOOD GLUCOSE          RADIOLOGY & ADDITIONAL TESTS: Reviewed. OVERNIGHT EVENTS: CHAPIS     SUBJECTIVE / INTERVAL HPI: Patient seen and examined at bedside. Patient resting in bed saying she feels good. Has a mild headache this morning. Denies chest pain, sob, nausea, vomiting, abdominal pain.     Satting 93-94% on 5L NC.     VITAL SIGNS:  Vital Signs Last 24 Hrs  T(C): 36.6 (15 Apr 2020 08:33), Max: 36.6 (15 Apr 2020 08:33)  T(F): 97.8 (15 Apr 2020 08:33), Max: 97.8 (15 Apr 2020 08:33)  HR: 78 (15 Apr 2020 08:33) (71 - 90)  BP: 98/63 (15 Apr 2020 08:33) (98/63 - 106/71)  BP(mean): --  RR: 17 (15 Apr 2020 08:33) (17 - 21)  SpO2: 97% (15 Apr 2020 08:33) (94% - 98%)    PHYSICAL EXAM:    General: WDWN  HEENT: NC/AT; PERRL, anicteric sclera; MMM  Neck: supple  Cardiovascular: +S1/S2; RRR  Respiratory: On NC, CTA B/L; no W/R/R  Gastrointestinal: soft, NT/ND; +BSx4  Extremities: WWP; no edema, clubbing or cyanosis  Vascular: 2+ radial, DP/PT pulses B/L  Neurological: AAOx3; no focal deficits    MEDICATIONS:  MEDICATIONS  (STANDING):  enoxaparin Injectable 40 milliGRAM(s) SubCutaneous every 24 hours  oxybutynin 5 milliGRAM(s) Oral daily  saccharomyces boulardii 250 milliGRAM(s) Oral two times a day    MEDICATIONS  (PRN):  acetaminophen   Tablet .. 650 milliGRAM(s) Oral every 6 hours PRN Temp greater or equal to 38C (100.4F), Mild Pain (1 - 3)  LORazepam     Tablet 0.5 milliGRAM(s) Oral daily PRN Anxiety      ALLERGIES:  Allergies    No Known Allergies    Intolerances        LABS:                        13.7   12.28 )-----------( 497      ( 15 Apr 2020 06:32 )             45.3     04-15    137  |  101  |  14  ----------------------------<  99  4.6   |  26  |  0.66    Ca    8.8      15 Apr 2020 06:32  Phos  4.3     04-15  Mg     2.2     04-15    TPro  5.7<L>  /  Alb  2.8<L>  /  TBili  <0.2  /  DBili  x   /  AST  14  /  ALT  19  /  AlkPhos  59  04-15      Urinalysis Basic - ( 14 Apr 2020 19:37 )    Color: Yellow / Appearance: SL Cloudy / SG: >=1.030 / pH: x  Gluc: x / Ketone: Trace mg/dL  / Bili: Negative / Urobili: 1.0 E.U./dL   Blood: x / Protein: 30 mg/dL / Nitrite: NEGATIVE   Leuk Esterase: NEGATIVE / RBC: x / WBC < 5 /HPF   Sq Epi: x / Non Sq Epi: 0-5 /HPF / Bacteria: Present /HPF      CAPILLARY BLOOD GLUCOSE          RADIOLOGY & ADDITIONAL TESTS: Reviewed.

## 2020-04-15 NOTE — PROGRESS NOTE ADULT - PROBLEM SELECTOR PLAN 3
RESOLVED.  - s/p CTX x 5 days for symptomatic UTI prior to stepdown from COVID tele to COVID regional

## 2020-04-15 NOTE — PROGRESS NOTE ADULT - PROBLEM SELECTOR PROBLEM 1
Pneumonia due to 2019 novel coronavirus

## 2020-04-15 NOTE — DISCHARGE NOTE PROVIDER - NSDCMRMEDTOKEN_GEN_ALL_CORE_FT
aspirin 325 mg oral tablet: 1 tab(s) orally once a day  calcium citrate:  orally 1000 mg/600 mg D3  oxybutynin 5 mg oral tablet:  orally   Tylenol with Codeine #3 300 mg-30 mg oral tablet: 1 tab(s) orally every 6 hours MDD:4 tabs acetaminophen 325 mg oral tablet: 2 tab(s) orally every 6 hours, As needed, Temp greater or equal to 38C (100.4F), Mild Pain (1 - 3)  aspirin 325 mg oral tablet: 1 tab(s) orally once a day  calcium citrate:  orally 1000 mg/600 mg D3  enoxaparin: 40 milligram(s) subcutaneous once a day  LORazepam 0.5 mg oral tablet: 1 tab(s) orally once a day, As needed, Anxiety  oxybutynin 5 mg oral tablet:  orally   saccharomyces boulardii lyo 250 mg oral capsule: 1 cap(s) orally 2 times a day acetaminophen 325 mg oral tablet: 2 tab(s) orally every 6 hours, As needed, Temp greater or equal to 38C (100.4F), Mild Pain (1 - 3)  aspirin 325 mg oral tablet: 1 tab(s) orally once a day  calcium citrate 950 mg (200 mg elemental calcium) oral tablet: 1 tab(s) orally once a day   enoxaparin: 40 milligram(s) subcutaneous once a day  LORazepam 0.5 mg oral tablet: 1 tab(s) orally once a day, As needed, Anxiety  LORazepam 0.5 mg oral tablet: 1 tab(s) orally once a day, As needed, Anxiety MDD:1mg  Lovenox 40 mg/0.4 mL injectable solution: 40 milligram(s) subcutaneously once a day   oxybutynin 5 mg oral tablet: 1 tab(s) orally once a day   saccharomyces boulardii lyo 250 mg oral capsule: 1 cap(s) orally 2 times a day

## 2020-04-16 LAB
CULTURE RESULTS: SIGNIFICANT CHANGE UP
SPECIMEN SOURCE: SIGNIFICANT CHANGE UP

## 2020-04-17 DIAGNOSIS — A41.9 SEPSIS, UNSPECIFIED ORGANISM: ICD-10-CM

## 2020-04-17 DIAGNOSIS — J96.01 ACUTE RESPIRATORY FAILURE WITH HYPOXIA: ICD-10-CM

## 2020-04-17 DIAGNOSIS — Z96.651 PRESENCE OF RIGHT ARTIFICIAL KNEE JOINT: ICD-10-CM

## 2020-04-17 DIAGNOSIS — Z86.711 PERSONAL HISTORY OF PULMONARY EMBOLISM: ICD-10-CM

## 2020-04-17 DIAGNOSIS — R65.20 SEVERE SEPSIS WITHOUT SEPTIC SHOCK: ICD-10-CM

## 2020-04-17 DIAGNOSIS — N39.0 URINARY TRACT INFECTION, SITE NOT SPECIFIED: ICD-10-CM

## 2020-04-17 DIAGNOSIS — Z79.82 LONG TERM (CURRENT) USE OF ASPIRIN: ICD-10-CM

## 2020-04-17 DIAGNOSIS — R06.02 SHORTNESS OF BREATH: ICD-10-CM

## 2020-04-17 DIAGNOSIS — J18.9 PNEUMONIA, UNSPECIFIED ORGANISM: ICD-10-CM

## 2020-04-17 PROBLEM — Z00.00 ENCOUNTER FOR PREVENTIVE HEALTH EXAMINATION: Status: ACTIVE | Noted: 2020-04-17

## 2020-04-30 ENCOUNTER — APPOINTMENT (OUTPATIENT)
Dept: NEUROLOGY | Facility: CLINIC | Age: 70
End: 2020-04-30
Payer: MEDICARE

## 2020-04-30 PROCEDURE — 90837 PSYTX W PT 60 MINUTES: CPT | Mod: 95

## 2020-05-05 NOTE — HISTORY OF PRESENT ILLNESS
[Verbal consent obtained from patient] : the patient, [unfilled] [FreeTextEntry1] : One hour telehealth psychotherapy performed by Neelam Avitia MA, under supervision of Dr. Alyx Calvo. \par \par SK: 68YO  female who was discharged from hospital following COVID\par \par •	Medical:\par o	History of pulmonary embolism from knee surgery in 2010\par o	COVID hospital stay\par ?	Admitted to hospital on 4/10 after 2 negative swabs and one positive – never intubated but needed oxygen mask\par ?	Transferred to stepdown unit on 4/13, where she was taken off the Venturi mask\par ?	Discharged to [facility] – while there her phone  went missing so she was afraid of not being in touch with her family\par o	Currently at home being “pampered” by ; \par ?	Has a tank of oxygen at home just in case, uses sperometer to check oxygen levels and breathing; still feeling shortness of breath and lack of energy; \par ?	Has lost weight since being hospitalized (lifelong struggle with weight issues), noticed that eats regularly just in smaller portions (feels like she requires “a lot less food” and feels good about it)\par ?	still in quarantine until the end of next week, went out once since getting home to get blood drawn (doesn’t love the feeling of mask and gloves because it feels confining), doesn’t feel the need to go out because she feels “content,” hasn’t experienced anxiety yet but doesn’t know if that will change in the future.\par •	Presenting complaints:\par o	Symptoms of anxiety: physical symptoms include shortness of breath (could also be related to COVID, currently uses sperometer), pulse racing; cognitive and emotional symptoms include “the need to resolve things right away” even if she feels that the problems are small,\par o	Anxiety triggers: worried about her own health, the health of her family, and the state of the world right now (doorman passed away from COVID); work issues (emailing clients gives her anxiety), stress about needing to solve things quickly\par o	Therapy/Medication history: \par •	Educational/Social/Occupational\par o	 to  B for more than 50 years, 2 children and 2 grandchildren; identified B and daughter as major support system (spoke very fondly of B) daughter has juvenile diabetes, so S is worried about her daughter’s risk of COVID and homeschooling her 2 sons\par o	S’s mother is 91 and alive, S shares responsibility of taking care of her mother with her sister; mother just retired from working at a nonprofit but is still active; mother is aware that S had COVID but not that she was hospitalized for it, some stress in keeping that information private\par o	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 but has been toying with [facility] for several years\par \par \par \par , 69, admitted 4/10, very anxious about covid and lifestyle right now, possibly underlying anxiety, history of pulmonary embolism from knee surgery in 2010, 2 negative swabs then positive, UTI, stepdown on 4/13, weaned to venturi mask, never intubated but needed oxygen masks, discharged to [facility], , sounds pleasant and alert, might have children; anxious after hospitalization and can’t breathe, specific anxiety, if she had therapy, identify goals, relaxation/visualization, healthy realization, things to do, support system, how does she feel about going outside if quarantine is over?, sleep/diet, exercise, very interested in therapy; 646 is cell\par  is good support system – but “I see it’s a slow process”\par 68YO, full-time working and toying with [employer] ( for furniture company),  for 50 years, 2 kids, 2 grandchildren, got a tank just in case and check oxygen level twice a day (previously once a day); concerns are shortness of breath and lack of energy, anxiety – not sure if aging (need to resolve things right away); phone  missing and anxious about not being able to be in touch with family; anxious about her  getting sick as well but he didn’t (they share a bed)\par Wants to be “as healthy as I was before” but also concerns about the state of the world, her doorman passed away from covid, worried about daughter (has juvenile diabetes, homeschooling her kids)\par Physical symptoms of anxiety: shortness of breath, pulse racing, (was given Ativan at  but only had Tylenol at [facility], also has prescription from [facility] on the way, klonopin), prior to covid took it once or twice (.5 mg), does not want to depend on medication, headache; thoughts include needing to solve the problem, sperometer, extremely short period of time (15 years ago), didn’t like bringing up stuff from under the surface, felt like “I didn’t need to get to the root of issues”\par Issue at work, needed to email client (anxious about handling it right) feels that she is impulsive to solve things quickly; occurs a few times a day about “silly things” – aside from taking half a klonopin, be calm, take a deep breath and decompress, still in quarantine since last Wednesday, \par Schedule – 3 meals a day, exercise 10-15 minutes of walking a few times a day, started responding to emails/calls, she “feels like a full-time job,” reading/[employer], telehealth appointments, always struggled with weight issues (got weight down years ago but it came up), since covid lost a lot of weight but \par Noted she tends to do things fast\par \par \par

## 2021-03-24 NOTE — H&P ADULT - DOES THIS PATIENT HAVE A HISTORY OF OR HAS BEEN DX WITH HEART FAILURE?
S: Kendra Chang is a 58 year old female who returns for follow up of  DEXA scan, bladder incontinence  Preventives due to Shingles   Up to date with Pheumovac    s/p back, right hip ,  RIGHT KNEES SURGERY: ON  Physical THERAPY. CRUTCHES  Patients states that main concern today is Bladder incontinence    PMHX/PSHX/MEDS/ALLERGIES/SHX/FHX reviewed and updated in Epic.      ROS:  General: No fevers, chills  Head: No headache  Ears: No acute change in hearing.    CV: No chest pain or palpitations.  Resp: No shortness of breath.  No cough. No hemoptysis.  GI: No nausea, vomiting, constipation, diarrhea  : No urinary pains    O: /75 (BP Location: Right arm, Patient Position: Sitting, Cuff Size: Adult Regular)   Pulse 66   Temp 98.9  F (37.2  C) (Oral)   Resp 14   Wt 70.3 kg (155 lb)   SpO2 97%   BMI 25.02 kg/m     Gen:  Well nourished and in NAD    Neck: supple without lymphadenopathy  CV:  RRR  - no murmurs, rubs, or gallups,   Pulm:  CTAB, no wheezes/rales/rhonchi, good air entry   ABD: soft, nontender, no masses, no rebound, BS intact throughout  Extrem: no cyanosis, edema or clubbing R knee brace  Psych: Euthymic      1 Bladder incontinence;:refereed to urology  2 DEXA  scan with osteoporosis/back and osteopenia left Hip   Ca/vitamin D   Fosamax weekly  3 S/p knee surgery follow-up with  ortho continue Physical therapy recovering well  4 Preventive: Shingles at pharmacy    .    RTC in 12 weeks, for follow up of  coordination of care or sooner if develops new or worsening symptoms.    Elfego Tom MD        
no

## 2022-12-26 NOTE — DISCHARGE NOTE PROVIDER - NSDCQMSTAIRS_GEN_ALL_CORE
Physical Therapy  Patient not seen in therapy.     On hold due to medical condition    Pt is on hold for therapy today secondary to acute DVT on distal Lt femoral vein and Lt popliteal vein.        OBJECTIVE                     Therapy procedure time and total treatment time can be found documented on the Time Entry flowsheet   No

## 2024-06-21 NOTE — ED ADULT NURSE NOTE - NSFALLRSKASSESSTYPE_ED_ALL_ED
Pt here for chemotherapy. Pt offers no new complaints, resting comfortably. Vitals stable upon admission. Labs reviewed from 6/18 . Call bell in reach.     Initial (On Arrival)